# Patient Record
Sex: FEMALE | Race: WHITE | Employment: OTHER | RURAL
[De-identification: names, ages, dates, MRNs, and addresses within clinical notes are randomized per-mention and may not be internally consistent; named-entity substitution may affect disease eponyms.]

---

## 2017-06-30 ENCOUNTER — HOSPITAL ENCOUNTER (OUTPATIENT)
Dept: MRI IMAGING | Age: 57
Discharge: HOME | End: 2017-06-30
Attending: NURSE PRACTITIONER | Admitting: NURSE PRACTITIONER

## 2017-06-30 DIAGNOSIS — R06.02 SHORTNESS OF BREATH: ICD-10-CM

## 2017-06-30 DIAGNOSIS — R20.0 NUMBNESS AND TINGLING OF RIGHT FACE: ICD-10-CM

## 2017-06-30 DIAGNOSIS — R20.2 NUMBNESS AND TINGLING OF RIGHT FACE: ICD-10-CM

## 2017-10-23 ENCOUNTER — OFFICE VISIT (OUTPATIENT)
Dept: SURGERY | Age: 57
End: 2017-10-23
Payer: MEDICAID

## 2017-10-23 ENCOUNTER — HOSPITAL ENCOUNTER (OUTPATIENT)
Dept: OTHER | Age: 57
Discharge: HOME | End: 2017-10-23
Attending: SURGERY | Admitting: SURGERY

## 2017-10-23 ENCOUNTER — SURG/PROC ORDERS (OUTPATIENT)
Dept: SURGERY | Age: 57
End: 2017-10-23

## 2017-10-23 VITALS
TEMPERATURE: 97.8 F | SYSTOLIC BLOOD PRESSURE: 124 MMHG | BODY MASS INDEX: 37.79 KG/M2 | RESPIRATION RATE: 16 BRPM | DIASTOLIC BLOOD PRESSURE: 83 MMHG | HEIGHT: 65 IN | WEIGHT: 226.8 LBS | HEART RATE: 60 BPM

## 2017-10-23 DIAGNOSIS — Z12.11 COLON CANCER SCREENING: Primary | ICD-10-CM

## 2017-10-23 PROCEDURE — 99244 OFF/OP CNSLTJ NEW/EST MOD 40: CPT | Performed by: SURGERY

## 2017-10-23 RX ORDER — SIMVASTATIN 20 MG
20 TABLET ORAL NIGHTLY
COMMUNITY

## 2017-10-23 RX ORDER — POTASSIUM CHLORIDE 20 MEQ/1
20 TABLET, EXTENDED RELEASE ORAL DAILY
COMMUNITY

## 2017-10-23 RX ORDER — ALBUTEROL SULFATE 90 UG/1
2 AEROSOL, METERED RESPIRATORY (INHALATION) EVERY 6 HOURS PRN
COMMUNITY

## 2017-10-23 RX ORDER — CARVEDILOL 6.25 MG/1
6.25 TABLET ORAL 2 TIMES DAILY WITH MEALS
COMMUNITY

## 2017-10-23 RX ORDER — SODIUM CHLORIDE 0.9 % (FLUSH) 0.9 %
10 SYRINGE (ML) INJECTION PRN
Status: CANCELLED | OUTPATIENT
Start: 2017-10-23

## 2017-10-23 RX ORDER — CLOPIDOGREL BISULFATE 75 MG/1
75 TABLET ORAL DAILY
COMMUNITY

## 2017-10-23 RX ORDER — CHLORTHALIDONE 25 MG/1
25 TABLET ORAL DAILY
COMMUNITY

## 2017-10-23 RX ORDER — IBUPROFEN 800 MG/1
TABLET ORAL 3 TIMES DAILY
COMMUNITY
Start: 2017-10-17 | End: 2018-04-30

## 2017-10-23 RX ORDER — NITROGLYCERIN 0.4 MG/1
0.4 TABLET SUBLINGUAL EVERY 5 MIN PRN
COMMUNITY

## 2017-10-23 RX ORDER — SODIUM CHLORIDE 0.9 % (FLUSH) 0.9 %
10 SYRINGE (ML) INJECTION EVERY 12 HOURS SCHEDULED
Status: CANCELLED | OUTPATIENT
Start: 2017-10-23

## 2017-10-23 RX ORDER — AMOXICILLIN AND CLAVULANATE POTASSIUM 875; 125 MG/1; MG/1
TABLET, FILM COATED ORAL 2 TIMES DAILY
COMMUNITY
Start: 2017-10-17 | End: 2018-04-30

## 2017-10-23 RX ORDER — SODIUM CHLORIDE, SODIUM LACTATE, POTASSIUM CHLORIDE, CALCIUM CHLORIDE 600; 310; 30; 20 MG/100ML; MG/100ML; MG/100ML; MG/100ML
INJECTION, SOLUTION INTRAVENOUS CONTINUOUS
Status: CANCELLED | OUTPATIENT
Start: 2017-10-23

## 2017-10-23 RX ORDER — AMLODIPINE BESYLATE 5 MG/1
5 TABLET ORAL DAILY
COMMUNITY

## 2017-10-23 RX ORDER — LOSARTAN POTASSIUM 100 MG/1
100 TABLET ORAL DAILY
COMMUNITY

## 2017-10-23 ASSESSMENT — ENCOUNTER SYMPTOMS
EYES NEGATIVE: 1
GASTROINTESTINAL NEGATIVE: 1
RESPIRATORY NEGATIVE: 1

## 2017-10-23 NOTE — LETTER
[address] [facility]  Santhosh MARTINS [facility] ZACARIAS SANTOS A Division of [address] [facility]  Phone:  [phone]  Fax:  [phone]  Colonoscopy Procedure Preparation Instructions    Name: Jey Nolasco  YQSMH'N Date: 10/23/2017   : 1960  Age: 56 y.o. Sex: female   MRN: [id number]  Guarantor Acct: [de-identified]   Clinic: [facility]  Physician: Dr. Vinh Conley    Procedure Date: _____________     Prep Date: _____________  In order for the physician to perform a colonoscopy, a bowel cleanout must be performed at home prior to the procedure, which allows them to see the walls of the colon. Items to Purchase:  ? MiraLAX (Polyethylene Glycol)  You will need the 238g bottle, which can be purchased over-the-counter or with a prescription. ? Dulcolax  You will need 4 tablets, which can be purchased over-the-counter or with a prescription  ? Fluids (64 oz.)  You will need to purchase this to mix with the MiraLAX. Gatorade is recommended, because it helps replace the electrolytes lost during the bowel prep. One Day Prior to Procedure:  Beginning with breakfast and ending at midnight, you must commit to a liquid diet the day before your procedure. Items to Avoid:  ? Liquids that are RED or PURPLE in color. Examples include cranberry juice, grape juice, cherry drinks, and cherry or grape jello  ? Dairy Products. Examples include milk, cheese, ice cream, yogurt  ? Juices with a PULP. Examples include Orange and Grapefruit juice  Items You May Have:   ? Soups:  Clear broth or bouillon  ? Sport Drinks:  Gatorade, Powerade, Propel  ? Juices: White cranberry, white grape, apple, limeade, and strained lemonade  ? Other Beverages:  tea, coffee, Mehul-Aid, soft drinks, and water  ? Desserts:  water ices, [place] ices, popcicles, and jello  Start The Prep:  2:30 p.m. The evening before the procedure take two Dulcolax tablets with clear liquid.
________________________________  Relationship      ________________________________  Witness

## 2017-10-23 NOTE — PROGRESS NOTES
Consult Note    Patient:   Eduardo Vasquez   YOB: 1960   Facility:   [place] [address]   Referring/PCP: Young Ness  Date:     10/23/2017  Consultant:   Bhumi Livingston MD  Reason for consult: colon screening    Subjective: This 56 y.o. female was seen  for colon screening. Patient has No prior colonoscopy. Information was obtained from interview of  the patient, examination of the patient, and review of records. I did  update the past medical, surgical, social and / or family history. Patient has had no symptoms. Weight up 40 pounds in the last year. Medications:     Prior to Admission medications    Medication Sig Start Date End Date Taking? Authorizing Provider   aspirin 81 MG tablet Take 81 mg by mouth daily   Yes Historical Provider, MD   albuterol sulfate  (90 Base) MCG/ACT inhaler Inhale 2 puffs into the lungs every 6 hours as needed for Wheezing   Yes Historical Provider, MD   chlorthalidone (HYGROTON) 25 MG tablet Take 25 mg by mouth daily   Yes Historical Provider, MD   carvedilol (COREG) 6.25 MG tablet Take 6.25 mg by mouth 2 times daily (with meals)   Yes Historical Provider, MD   losartan (COZAAR) 100 MG tablet Take 100 mg by mouth daily   Yes Historical Provider, MD   nitroGLYCERIN (NITROSTAT) 0.4 MG SL tablet Place 0.4 mg under the tongue every 5 minutes as needed for Chest pain up to max of 3 total doses. If no relief after 1 dose, call 911.    Yes Historical Provider, MD   amLODIPine (NORVASC) 5 MG tablet Take 5 mg by mouth daily   Yes Historical Provider, MD   clopidogrel (PLAVIX) 75 MG tablet Take 75 mg by mouth daily   Yes Historical Provider, MD   potassium chloride (KLOR-CON M) 20 MEQ extended release tablet Take 20 mEq by mouth daily   Yes Historical Provider, MD   simvastatin (ZOCOR) 20 MG tablet Take 20 mg by mouth nightly   Yes Historical Provider, MD   sertraline (ZOLOFT) 50 MG tablet Take 50 mg by mouth daily   Yes Historical Provider, MD   ibuprofen (ADVIL;MOTRIN) 800 MG tablet 3 times daily 10/17/17  Yes Historical Provider, MD   amoxicillin-clavulanate (AUGMENTIN) 875-125 MG per tablet 2 times daily 10/17/17  Yes Historical Provider, MD        Allergies: No Known Allergies      History:     Past Medical History:   Diagnosis Date    CAD (coronary artery disease)     COPD (chronic obstructive pulmonary disease) ([address]     Hypertension      Past Surgical History:   Procedure Laterality Date    APPENDECTOMY      ARM SURGERY Left 1976    reconstruction due to  a fall    CARPAL TUNNEL RELEASE Right 1986    CORONARY ANGIOPLASTY WITH STENT PLACEMENT  2013    x 4 stents    TUBAL LIGATION  1992       Social:   Social History   Substance Use Topics    Smoking status: Current Every Day Smoker     Packs/day: 0.50     Years: 43.00     Types: Cigarettes    Smokeless tobacco: Never Used    Alcohol use Yes      Comment: social     Family:   Family History   Problem Relation Age of Onset    Diabetes Mother     Cancer Father     Diabetes Father        Review of Systems:   Review of Systems   Constitutional: Negative. HENT: Negative. Eyes: Negative. Respiratory: Negative. Cardiovascular: Negative. Gastrointestinal: Negative. Genitourinary: Negative. Musculoskeletal: Negative. Skin: Negative. Neurological: Negative. Endo/Heme/Allergies: Negative. Psychiatric/Behavioral: Negative. Objective:   Vital Signs:  /83 (Site: Left Arm, Position: Sitting)   Pulse 60   Temp 97.8 °F (36.6 °C)   Resp 16   Ht 5' 5\" (1.651 m)   Wt 226 lb 12.8 oz (102.9 kg)   LMP  (LMP Unknown) Comment: menopause  BMI 37.74 kg/m²    Vitals:    10/23/17 1151   BP: 124/83   Pulse: 60   Resp: 16   Temp: 97.8 °F (36.6 °C)       Physical Exam:   Physical Exam   Constitutional: She is oriented to person, place, and time. She appears well-developed and well-nourished. HENT:   Head: Normocephalic and atraumatic. Nose: Nose normal.   Mouth/Throat: Oropharynx is clear and moist. No oropharyngeal exudate. Eyes: Conjunctivae are normal. Pupils are equal, round, and reactive to light. No scleral icterus. Neck: Normal range of motion. Neck supple. No JVD present. No tracheal deviation present. No thyromegaly present. Cardiovascular: Normal rate, regular rhythm and normal heart sounds. Exam reveals no gallop and no friction rub. No murmur heard. Pulmonary/Chest: Effort normal and breath sounds normal. She has no wheezes. She has no rales. Abdominal: Soft. Bowel sounds are normal. She exhibits no distension and no mass. There is no tenderness. There is no rebound and no guarding. Musculoskeletal: Normal range of motion. She exhibits no edema or tenderness. Lymphadenopathy:     She has no cervical adenopathy. Neurological: She is alert and oriented to person, place, and time. She has normal reflexes. She exhibits normal muscle tone. Coordination normal.   Skin: Skin is warm and dry. Psychiatric: She has a normal mood and affect. Her behavior is normal.   Nursing note and vitals reviewed. Assessment: For elective colon screening      Plan:   1. Patient has reviewed colonoscopy pamphlet and we have discussed the possible risks and complications, including the remote chance of perforation, and the patient appears to understand and gives informed consent  2. We are scheduling a colonoscopy at [facility] on 10/27/17. 3. The patient will take the outpatient Miralax prep. Copy of this consult has been faxed to WILLEM Andrea; thank you for the opportunity to participate in this patient's care.     Electronically signed by Loki Georges MD on 10/23/2017 at 12:23 PM

## 2017-10-23 NOTE — PROGRESS NOTES
Patient is here today for colon screening. States no prior colonoscopy. Denies having any symptoms with colon.

## 2017-10-24 ENCOUNTER — HOSPITAL ENCOUNTER (OUTPATIENT)
Dept: GENERAL RADIOLOGY | Age: 57
Discharge: HOME | End: 2017-10-24
Attending: NURSE PRACTITIONER | Admitting: NURSE PRACTITIONER

## 2017-10-24 DIAGNOSIS — R06.02 SHORTNESS OF BREATH: ICD-10-CM

## 2017-10-24 DIAGNOSIS — Z12.39 BREAST CANCER SCREENING: ICD-10-CM

## 2017-10-24 DIAGNOSIS — Z72.0 TOBACCO USE: ICD-10-CM

## 2018-04-19 ENCOUNTER — HOSPITAL ENCOUNTER (OUTPATIENT)
Dept: RESPIRATORY THERAPY | Age: 58
Discharge: HOME | End: 2018-04-19

## 2018-04-19 DIAGNOSIS — R06.02 SHORTNESS OF BREATH: ICD-10-CM

## 2018-10-16 ENCOUNTER — HOSPITAL ENCOUNTER (OUTPATIENT)
Dept: GENERAL RADIOLOGY | Facility: HOSPITAL | Age: 58
Discharge: HOME | End: 2018-10-16
Admitting: PODIATRIST

## 2018-10-16 ENCOUNTER — APPOINTMENT (OUTPATIENT)
Dept: PREADMISSION TESTING | Facility: HOSPITAL | Age: 58
End: 2018-10-16

## 2018-10-16 ENCOUNTER — PREP FOR SURGERY (OUTPATIENT)
Dept: OTHER | Facility: HOSPITAL | Age: 58
End: 2018-10-16

## 2018-10-16 ENCOUNTER — OFFICE VISIT (OUTPATIENT)
Dept: ORTHOPEDIC SURGERY | Facility: CLINIC | Age: 58
End: 2018-10-16

## 2018-10-16 VITALS — HEIGHT: 66 IN | BODY MASS INDEX: 36.16 KG/M2 | WEIGHT: 225 LBS | RESPIRATION RATE: 18 BRPM

## 2018-10-16 VITALS — HEIGHT: 66 IN | BODY MASS INDEX: 36.16 KG/M2 | WEIGHT: 225 LBS

## 2018-10-16 DIAGNOSIS — S82.841A CLOSED BIMALLEOLAR FRACTURE OF RIGHT ANKLE, INITIAL ENCOUNTER: Primary | ICD-10-CM

## 2018-10-16 DIAGNOSIS — S82.891A CLOSED FRACTURE OF RIGHT ANKLE, INITIAL ENCOUNTER: ICD-10-CM

## 2018-10-16 DIAGNOSIS — S82.891A CLOSED FRACTURE OF RIGHT ANKLE, INITIAL ENCOUNTER: Primary | ICD-10-CM

## 2018-10-16 LAB
ALBUMIN SERPL-MCNC: 4.5 G/DL (ref 3.5–5)
ALBUMIN/GLOB SERPL: 1.4 G/DL (ref 1–2)
ALP SERPL-CCNC: 46 U/L (ref 38–126)
ALT SERPL W P-5'-P-CCNC: 35 U/L (ref 13–69)
ANION GAP SERPL CALCULATED.3IONS-SCNC: 10.7 MMOL/L (ref 10–20)
AST SERPL-CCNC: 33 U/L (ref 15–46)
BASOPHILS # BLD AUTO: 0.04 10*3/MM3 (ref 0–0.2)
BASOPHILS NFR BLD AUTO: 0.4 % (ref 0–2.5)
BILIRUB SERPL-MCNC: 0.6 MG/DL (ref 0.2–1.3)
BUN BLD-MCNC: 17 MG/DL (ref 7–20)
BUN/CREAT SERPL: 17 (ref 7.1–23.5)
CALCIUM SPEC-SCNC: 9.9 MG/DL (ref 8.4–10.2)
CHLORIDE SERPL-SCNC: 101 MMOL/L (ref 98–107)
CO2 SERPL-SCNC: 31 MMOL/L (ref 26–30)
CREAT BLD-MCNC: 1 MG/DL (ref 0.6–1.3)
DEPRECATED RDW RBC AUTO: 45.7 FL (ref 37–54)
EOSINOPHIL # BLD AUTO: 0.35 10*3/MM3 (ref 0–0.7)
EOSINOPHIL NFR BLD AUTO: 3.2 % (ref 0–7)
ERYTHROCYTE [DISTWIDTH] IN BLOOD BY AUTOMATED COUNT: 13.1 % (ref 11.5–14.5)
GFR SERPL CREATININE-BSD FRML MDRD: 57 ML/MIN/1.73
GLOBULIN UR ELPH-MCNC: 3.3 GM/DL
GLUCOSE BLD-MCNC: 91 MG/DL (ref 74–98)
HCT VFR BLD AUTO: 45.6 % (ref 37–47)
HGB BLD-MCNC: 15.3 G/DL (ref 12–16)
IMM GRANULOCYTES # BLD: 0.03 10*3/MM3 (ref 0–0.06)
IMM GRANULOCYTES NFR BLD: 0.3 % (ref 0–0.6)
LYMPHOCYTES # BLD AUTO: 2.35 10*3/MM3 (ref 0.6–3.4)
LYMPHOCYTES NFR BLD AUTO: 21.6 % (ref 10–50)
MCH RBC QN AUTO: 31.8 PG (ref 27–31)
MCHC RBC AUTO-ENTMCNC: 33.6 G/DL (ref 30–37)
MCV RBC AUTO: 94.8 FL (ref 81–99)
MONOCYTES # BLD AUTO: 0.75 10*3/MM3 (ref 0–0.9)
MONOCYTES NFR BLD AUTO: 6.9 % (ref 0–12)
NEUTROPHILS # BLD AUTO: 7.38 10*3/MM3 (ref 2–6.9)
NEUTROPHILS NFR BLD AUTO: 67.6 % (ref 37–80)
NRBC BLD MANUAL-RTO: 0 /100 WBC (ref 0–0)
PLATELET # BLD AUTO: 208 10*3/MM3 (ref 130–400)
PMV BLD AUTO: 10.7 FL (ref 6–12)
POTASSIUM BLD-SCNC: 2.7 MMOL/L (ref 3.5–5.1)
PROT SERPL-MCNC: 7.8 G/DL (ref 6.3–8.2)
RBC # BLD AUTO: 4.81 10*6/MM3 (ref 4.2–5.4)
SODIUM BLD-SCNC: 140 MMOL/L (ref 137–145)
WBC NRBC COR # BLD: 10.9 10*3/MM3 (ref 4.8–10.8)

## 2018-10-16 PROCEDURE — 85025 COMPLETE CBC W/AUTO DIFF WBC: CPT | Performed by: PODIATRIST

## 2018-10-16 PROCEDURE — 71046 X-RAY EXAM CHEST 2 VIEWS: CPT

## 2018-10-16 PROCEDURE — 36415 COLL VENOUS BLD VENIPUNCTURE: CPT

## 2018-10-16 PROCEDURE — 93005 ELECTROCARDIOGRAM TRACING: CPT | Performed by: PODIATRIST

## 2018-10-16 PROCEDURE — 80053 COMPREHEN METABOLIC PANEL: CPT | Performed by: PODIATRIST

## 2018-10-16 PROCEDURE — 99204 OFFICE O/P NEW MOD 45 MIN: CPT | Performed by: PODIATRIST

## 2018-10-16 RX ORDER — CHLORTHALIDONE 25 MG/1
25 TABLET ORAL DAILY
COMMUNITY
Start: 2018-10-12

## 2018-10-16 RX ORDER — HYDROCODONE BITARTRATE AND ACETAMINOPHEN 5; 325 MG/1; MG/1
1 TABLET ORAL EVERY 6 HOURS PRN
COMMUNITY
Start: 2018-10-15 | End: 2020-12-21

## 2018-10-16 RX ORDER — BUSPIRONE HYDROCHLORIDE 5 MG/1
5 TABLET ORAL 3 TIMES DAILY
COMMUNITY
Start: 2018-10-12

## 2018-10-16 RX ORDER — LOSARTAN POTASSIUM 100 MG/1
TABLET ORAL
COMMUNITY
Start: 2018-10-12

## 2018-10-16 RX ORDER — ASPIRIN 81 MG/1
81 TABLET, DELAYED RELEASE ORAL DAILY
COMMUNITY
Start: 2018-10-12

## 2018-10-16 RX ORDER — LORATADINE 10 MG/1
10 TABLET ORAL DAILY
COMMUNITY
Start: 2018-10-12

## 2018-10-16 RX ORDER — POTASSIUM CHLORIDE 20 MEQ/1
20 TABLET, EXTENDED RELEASE ORAL DAILY
COMMUNITY
Start: 2018-10-12

## 2018-10-16 RX ORDER — CLOPIDOGREL BISULFATE 75 MG/1
75 TABLET ORAL
COMMUNITY
Start: 2018-10-12

## 2018-10-16 RX ORDER — AMLODIPINE BESYLATE 5 MG/1
5 TABLET ORAL
COMMUNITY
Start: 2018-10-12

## 2018-10-16 RX ORDER — SIMVASTATIN 20 MG
20 TABLET ORAL NIGHTLY
COMMUNITY
Start: 2018-10-12

## 2018-10-16 RX ORDER — AMITRIPTYLINE HYDROCHLORIDE 25 MG/1
10 TABLET, FILM COATED ORAL NIGHTLY
COMMUNITY
Start: 2018-10-12

## 2018-10-16 RX ORDER — CEFAZOLIN SODIUM 2 G/50ML
2 SOLUTION INTRAVENOUS ONCE
Status: CANCELLED | OUTPATIENT
Start: 2018-10-16 | End: 2018-10-16

## 2018-10-16 RX ORDER — CARVEDILOL 6.25 MG/1
6.25 TABLET ORAL 2 TIMES DAILY WITH MEALS
COMMUNITY
Start: 2018-10-12

## 2018-10-16 NOTE — NURSING NOTE
1535 CALLED ABNORMAL PRELIM EKG TO MICKEY WILLARD CRNA, WITH PT'S HX OF STENT PLACEMENT AND SEEING DR AGOSTO IN THE PAST. WILL HAVE EKG READ AND CALL BACK IF STILL ABNORMAL. PT IS TO SEE DR AGOSTO THIS Friday WILL SEND A READ COPY TO HIS OFFICE PRIOR TO THIS VISIT IF AVAILABLE IF NOT AVAILABLE WILL SEND PRELIM REPORT.

## 2018-10-16 NOTE — PROGRESS NOTES
Subjective   Patient ID: Valentina Theodore is a 58 y.o. female   Pain and Fracture of the Right Ankle   comes in the office today status post a fall that happened on Saturday.  She states she fell at home in the mud and heard 2 pops.  She was seen at [facility] and comes in today with a posterior splint and is sitting on a walker.  States she's fairly healthy aside from her blood pressure but she has had stents placed in her heart and takes Plavix.  She states Dr. Painter is her cardiologist but she has not seen him in quite some time.    History of Present Illness    Pain Score: 9  Pain Location: Ankle  Pain Orientation: Right     Pain Descriptors: Stabbing, Sharp, Throbbing     Pain Onset: Sudden     Clinical Progression: Gradually worsening              Result of Injury: Yes ( reports she slipped in the mud at home on 10/13/18 and fell on the knee and heard a loud pop in the right ankle )  Work-Related Injury: No    Review of Systems   Constitutional: Negative for diaphoresis, fever and unexpected weight change.   HENT: Negative for dental problem and sore throat.    Eyes: Negative for visual disturbance.   Respiratory: Negative for shortness of breath.    Cardiovascular: Negative for chest pain.   Gastrointestinal: Negative for abdominal pain, constipation, diarrhea, nausea and vomiting.   Genitourinary: Negative for difficulty urinating and frequency.   Musculoskeletal: Positive for arthralgias (Left ankle).   Neurological: Negative for headaches.   Hematological: Does not bruise/bleed easily.   All other systems reviewed and are negative.      Past Medical History:   Diagnosis Date   • Hypertension         Past Surgical History:   Procedure Laterality Date   • APPENDECTOMY     • ARM TENDON REPAIR Left 1976   • TUBAL ABDOMINAL LIGATION         No Known Allergies      Current Outpatient Prescriptions:   •  amitriptyline (ELAVIL) 25 MG tablet, , Disp: , Rfl:   •  amLODIPine (NORVASC) 5 MG  tablet, , Disp: , Rfl:   •  busPIRone (BUSPAR) 5 MG tablet, , Disp: , Rfl:   •  carvedilol (COREG) 6.25 MG tablet, , Disp: , Rfl:   •  chlorthalidone (HYGROTON) 25 MG tablet, , Disp: , Rfl:   •  clopidogrel (PLAVIX) 75 MG tablet, , Disp: , Rfl:   •  HYDROcodone-acetaminophen (NORCO) 5-325 MG per tablet, , Disp: , Rfl:   •  loratadine (CLARITIN) 10 MG tablet, , Disp: , Rfl:   •  losartan (COZAAR) 100 MG tablet, , Disp: , Rfl:   •  potassium chloride (K-DUR,KLOR-CON) 20 MEQ CR tablet, , Disp: , Rfl:   •  sertraline (ZOLOFT) 50 MG tablet, , Disp: , Rfl:   •  simvastatin (ZOCOR) 20 MG tablet, , Disp: , Rfl:   •  SM ASPIRIN ADULT LOW STRENGTH 81 MG EC tablet, , Disp: , Rfl:     Family History   Problem Relation Age of Onset   • Diabetes Mother        Social History     Social History   • Marital status:      Spouse name: N/A   • Number of children: N/A   • Years of education: N/A     Occupational History   • Not on file.     Social History Main Topics   • Smoking status: Current Every Day Smoker     Packs/day: 2.00     Start date: 1976   • Smokeless tobacco: Never Used   • Alcohol use No   • Drug use: No   • Sexual activity: Defer     Other Topics Concern   • Not on file     Social History Narrative   • No narrative on file       Ready to quit: No  Counseling given: Yes       I have reviewed all of the above social hx, family hx, surgical hx, medications, allergies & ROS and confirm that it is accurate.  Objective   Physical Exam   Constitutional: She is oriented to person, place, and time. She appears well-developed and well-nourished.   HENT:   Head: Normocephalic and atraumatic.   Eyes: Pupils are equal, round, and reactive to light. EOM are normal.   Neck: Normal range of motion.   Cardiovascular: Normal rate.    Pulmonary/Chest: Effort normal.   Abdominal: Soft. Bowel sounds are normal.   Musculoskeletal: Normal range of motion.   Neurological: She is alert and oriented to person, place, and time. She has  normal reflexes.   Skin: Skin is warm.   Psychiatric: She has a normal mood and affect. Her behavior is normal. Judgment and thought content normal.   Nursing note and vitals reviewed.    Ortho Exam  Ortho Exam right Lower extremity exam:  Vascular: Pulses palpable, pedal hair noted, 2+ edema noted to the right lower extremity, CFT brisk  Neuro: Sensation intact.  No sign of compartment syndrome.  Derm: She shows minimal bruising and ecchymosis to the right ankle and foot  MSK: She is able to move her digits.  Ankle range of motion is limited due to pain and guarding.  She is tender globally about the ankle.    Assessment/Plan right bimalleolar ankle fracture  Independent Review of Radiographic Studies:    Three-view x-rays brought with her from an outside facility.  No comparison films available.  There is a obliquely oriented Jiang B type fracture of the right distal fibula that starts at the joint.  There is slight shortening and lateral displacement with some gapping between the 2 fragments.  There is also a very irregular appearing what seems to be transversely oriented medial malleolus fracture that may just involve the anterior colliculus but it's difficult to tell based off the films.  Laboratory and Other Studies:     Medical Decision Making:        Procedures  [] No procedures were performed in office today.    There are no diagnoses linked to this encounter.      Recommendations/Plan:  I discussed with her that based off radiographic findings and this being a bimalleolar ankle fracture I would recommend surgical treatment.  I explained that while the fractures are not significantly displaced fractures of both sides of the ankle create a significant instability which can be extremely problematic in the long-term.  She expresses understanding.  She will have to be sent for cardiac clearance first and we will attempt to contact Dr. Painter for that.  If she is cleared for surgery we will look to schedule at  the following week.  This could be done under straight regional anesthesia if need be.  I would also like to hold her Plavix if at all possible for a few days preoperatively.  I will coordinate Berenice indicate this with Dr. Painter.  We will go ahead and send her for preoperative testing.  She is to remain strictly nonweightbearing to the right lower extremity.  Her previously placed splint and stirrup were placed.  She is to continue with rest, ice, compression, elevation.  I recommended Tylenol.  I offered pain medication but she states that she doesn't like to wait makes her feel.  Anti-inflammatories are contraindicated due to the Plavix.  I reviewed the patient's x-rays with them and discussed the injury and the nature of the fracture.  We discussed conservative versus surgical treatment options.  I discussed with them that due to the nature of the fracture I recommend surgical intervention including open reduction internal fixation.  I discussed all the risks which benefits and complications of procedure.  This includes bleeding, infection, nerve damage, wound complications, need for further surgery, delayed union, nonunion, potential hardware removal, blood clot and DVT as well as death.  They expressed understanding of this agreed to proceed with surgery consent was obtained.  We will schedule them for ORIF      No Follow-up on file.  Patient agreeable to call or return sooner for any concerns.

## 2018-10-18 NOTE — TELEPHONE ENCOUNTER
Patient called for pain medication spoke with Dr. Diaz please call in tramadol.  Tramadol called in and patient informed

## 2018-10-23 RX ORDER — TRAMADOL HYDROCHLORIDE 50 MG/1
50 TABLET ORAL EVERY 6 HOURS PRN
Qty: 30 TABLET | Refills: 0
Start: 2018-10-23 | End: 2020-12-21

## 2018-10-25 ENCOUNTER — HOSPITAL ENCOUNTER (OUTPATIENT)
Facility: HOSPITAL | Age: 58
Discharge: HOME | End: 2018-10-25
Payer: MEDICAID

## 2018-10-25 DIAGNOSIS — Z86.39 HISTORY OF LOW POTASSIUM: Primary | ICD-10-CM

## 2018-10-25 LAB — POTASSIUM SERPL-SCNC: 3.7 MMOL/L (ref 3.4–5.1)

## 2018-10-25 PROCEDURE — 84132 ASSAY OF SERUM POTASSIUM: CPT

## 2018-10-25 PROCEDURE — 36415 COLL VENOUS BLD VENIPUNCTURE: CPT

## 2018-10-26 ENCOUNTER — APPOINTMENT (OUTPATIENT)
Dept: GENERAL RADIOLOGY | Facility: HOSPITAL | Age: 58
End: 2018-10-26
Attending: PODIATRIST

## 2018-10-26 ENCOUNTER — ANESTHESIA (OUTPATIENT)
Dept: PERIOP | Facility: HOSPITAL | Age: 58
End: 2018-10-26

## 2018-10-26 ENCOUNTER — ANESTHESIA EVENT (OUTPATIENT)
Dept: PERIOP | Facility: HOSPITAL | Age: 58
End: 2018-10-26

## 2018-10-26 ENCOUNTER — HOSPITAL ENCOUNTER (OUTPATIENT)
Facility: HOSPITAL | Age: 58
Setting detail: HOSPITAL OUTPATIENT SURGERY
Discharge: HOME | End: 2018-10-26
Attending: PODIATRIST | Admitting: PODIATRIST

## 2018-10-26 VITALS
RESPIRATION RATE: 18 BRPM | DIASTOLIC BLOOD PRESSURE: 64 MMHG | HEART RATE: 70 BPM | OXYGEN SATURATION: 93 % | SYSTOLIC BLOOD PRESSURE: 108 MMHG | TEMPERATURE: 97.9 F

## 2018-10-26 DIAGNOSIS — S82.891A CLOSED FRACTURE OF RIGHT ANKLE, INITIAL ENCOUNTER: ICD-10-CM

## 2018-10-26 LAB — POTASSIUM BLD-SCNC: 3.5 MMOL/L (ref 3.5–5.1)

## 2018-10-26 PROCEDURE — C1713 ANCHOR/SCREW BN/BN,TIS/BN: HCPCS | Performed by: PODIATRIST

## 2018-10-26 PROCEDURE — 15271 SKIN SUB GRAFT TRNK/ARM/LEG: CPT | Performed by: PODIATRIST

## 2018-10-26 PROCEDURE — 25010000002 MIDAZOLAM PER 1 MG: Performed by: NURSE ANESTHETIST, CERTIFIED REGISTERED

## 2018-10-26 PROCEDURE — 94799 UNLISTED PULMONARY SVC/PX: CPT

## 2018-10-26 PROCEDURE — 25010000002 ONDANSETRON PER 1 MG: Performed by: NURSE ANESTHETIST, CERTIFIED REGISTERED

## 2018-10-26 PROCEDURE — 94640 AIRWAY INHALATION TREATMENT: CPT

## 2018-10-26 PROCEDURE — 25010000003 CEFAZOLIN SODIUM-DEXTROSE 2-3 GM-%(50ML) RECONSTITUTED SOLUTION: Performed by: PODIATRIST

## 2018-10-26 PROCEDURE — 76000 FLUOROSCOPY <1 HR PHYS/QHP: CPT

## 2018-10-26 PROCEDURE — 25010000002 PROPOFOL 200 MG/20ML EMULSION: Performed by: NURSE ANESTHETIST, CERTIFIED REGISTERED

## 2018-10-26 PROCEDURE — 27814 TREATMENT OF ANKLE FRACTURE: CPT | Performed by: PODIATRIST

## 2018-10-26 PROCEDURE — 84132 ASSAY OF SERUM POTASSIUM: CPT | Performed by: PODIATRIST

## 2018-10-26 PROCEDURE — 25010000002 DEXAMETHASONE PER 1 MG: Performed by: NURSE ANESTHETIST, CERTIFIED REGISTERED

## 2018-10-26 DEVICE — MINI MONSTER 4.0 X 40MM HEADED SCREW, LONG THREAD
Type: IMPLANTABLE DEVICE | Site: ANKLE | Status: FUNCTIONAL
Brand: MONSTER SCREW SYSTEM

## 2018-10-26 DEVICE — MINI MONSTER 4.0 X 40MM HEADED SCREW, SHORT THREAD
Type: IMPLANTABLE DEVICE | Site: ANKLE | Status: FUNCTIONAL
Brand: MONSTER SCREW SYSTEM

## 2018-10-26 DEVICE — GORILLA, ANKLE FX, ANATOMICAL FIBULAR PLATE R, 09-HOLE
Type: IMPLANTABLE DEVICE | Site: ANKLE | Status: FUNCTIONAL
Brand: GORILLA PLATING SYSTEM

## 2018-10-26 DEVICE — 3.5 X 10 MM R3CON LOCKING PLATE SCREW
Type: IMPLANTABLE DEVICE | Site: ANKLE | Status: FUNCTIONAL
Brand: GORILLA PLATING SYSTEM

## 2018-10-26 DEVICE — 3.5 X 12 MM R3CON LOCKING PLATE SCREW
Type: IMPLANTABLE DEVICE | Site: ANKLE | Status: FUNCTIONAL
Brand: GORILLA PLATING SYSTEM

## 2018-10-26 DEVICE — 3.5 X 20 MM R3CON NON-LOCKING PLATE SCREW
Type: IMPLANTABLE DEVICE | Site: ANKLE | Status: FUNCTIONAL
Brand: GORILLA PLATING SYSTEM

## 2018-10-26 DEVICE — 3.5 X 12 MM R3CON NON-LOCKING PLATE SCREW
Type: IMPLANTABLE DEVICE | Site: ANKLE | Status: FUNCTIONAL
Brand: GORILLA PLATING SYSTEM

## 2018-10-26 DEVICE — ALLOGRFT AMNIO AMNIOFIX 2X12CM: Type: IMPLANTABLE DEVICE | Site: ANKLE | Status: FUNCTIONAL

## 2018-10-26 RX ORDER — MIDAZOLAM HYDROCHLORIDE 1 MG/ML
INJECTION INTRAMUSCULAR; INTRAVENOUS AS NEEDED
Status: DISCONTINUED | OUTPATIENT
Start: 2018-10-26 | End: 2018-10-26 | Stop reason: SURG

## 2018-10-26 RX ORDER — BUPIVACAINE HYDROCHLORIDE 2.5 MG/ML
INJECTION, SOLUTION EPIDURAL; INFILTRATION; INTRACAUDAL
Status: COMPLETED
Start: 2018-10-26 | End: 2018-10-26

## 2018-10-26 RX ORDER — OXYCODONE AND ACETAMINOPHEN 7.5; 325 MG/1; MG/1
1 TABLET ORAL EVERY 6 HOURS PRN
Qty: 30 TABLET | Refills: 0 | Status: SHIPPED | OUTPATIENT
Start: 2018-10-26 | End: 2020-12-21

## 2018-10-26 RX ORDER — ONDANSETRON 2 MG/ML
4 INJECTION INTRAMUSCULAR; INTRAVENOUS ONCE AS NEEDED
Status: DISCONTINUED | OUTPATIENT
Start: 2018-10-26 | End: 2018-10-26 | Stop reason: HOSPADM

## 2018-10-26 RX ORDER — MEPERIDINE HYDROCHLORIDE 50 MG/ML
50 INJECTION INTRAMUSCULAR; INTRAVENOUS; SUBCUTANEOUS ONCE AS NEEDED
Status: DISCONTINUED | OUTPATIENT
Start: 2018-10-26 | End: 2018-10-26 | Stop reason: HOSPADM

## 2018-10-26 RX ORDER — ONDANSETRON 2 MG/ML
INJECTION INTRAMUSCULAR; INTRAVENOUS AS NEEDED
Status: DISCONTINUED | OUTPATIENT
Start: 2018-10-26 | End: 2018-10-26 | Stop reason: SURG

## 2018-10-26 RX ORDER — BUPIVACAINE HYDROCHLORIDE 2.5 MG/ML
INJECTION, SOLUTION EPIDURAL; INFILTRATION; INTRACAUDAL AS NEEDED
Status: DISCONTINUED | OUTPATIENT
Start: 2018-10-26 | End: 2018-10-26 | Stop reason: SURG

## 2018-10-26 RX ORDER — SODIUM CHLORIDE, SODIUM LACTATE, POTASSIUM CHLORIDE, CALCIUM CHLORIDE 600; 310; 30; 20 MG/100ML; MG/100ML; MG/100ML; MG/100ML
1000 INJECTION, SOLUTION INTRAVENOUS CONTINUOUS
Status: DISCONTINUED | OUTPATIENT
Start: 2018-10-26 | End: 2018-10-26 | Stop reason: HOSPADM

## 2018-10-26 RX ORDER — BUPIVACAINE HCL/0.9 % NACL/PF 0.125 %
PLASTIC BAG, INJECTION (ML) EPIDURAL AS NEEDED
Status: DISCONTINUED | OUTPATIENT
Start: 2018-10-26 | End: 2018-10-26 | Stop reason: SURG

## 2018-10-26 RX ORDER — CEFAZOLIN SODIUM 2 G/50ML
2 SOLUTION INTRAVENOUS ONCE
Status: COMPLETED | OUTPATIENT
Start: 2018-10-26 | End: 2018-10-26

## 2018-10-26 RX ORDER — IPRATROPIUM BROMIDE AND ALBUTEROL SULFATE 2.5; .5 MG/3ML; MG/3ML
3 SOLUTION RESPIRATORY (INHALATION) ONCE AS NEEDED
Status: COMPLETED | OUTPATIENT
Start: 2018-10-26 | End: 2018-10-26

## 2018-10-26 RX ORDER — IPRATROPIUM BROMIDE AND ALBUTEROL SULFATE 2.5; .5 MG/3ML; MG/3ML
3 SOLUTION RESPIRATORY (INHALATION) ONCE AS NEEDED
Status: DISCONTINUED | OUTPATIENT
Start: 2018-10-26 | End: 2018-10-26 | Stop reason: HOSPADM

## 2018-10-26 RX ORDER — DEXAMETHASONE SODIUM PHOSPHATE 4 MG/ML
INJECTION, SOLUTION INTRA-ARTICULAR; INTRALESIONAL; INTRAMUSCULAR; INTRAVENOUS; SOFT TISSUE
Status: DISCONTINUED
Start: 2018-10-26 | End: 2018-10-26 | Stop reason: HOSPADM

## 2018-10-26 RX ORDER — BACITRACIN 50000 [IU]/1
INJECTION, POWDER, FOR SOLUTION INTRAMUSCULAR AS NEEDED
Status: DISCONTINUED | OUTPATIENT
Start: 2018-10-26 | End: 2018-10-26 | Stop reason: HOSPADM

## 2018-10-26 RX ORDER — KETAMINE HYDROCHLORIDE 50 MG/ML
INJECTION, SOLUTION, CONCENTRATE INTRAMUSCULAR; INTRAVENOUS AS NEEDED
Status: DISCONTINUED | OUTPATIENT
Start: 2018-10-26 | End: 2018-10-26 | Stop reason: SURG

## 2018-10-26 RX ORDER — SODIUM CHLORIDE 0.9 % (FLUSH) 0.9 %
3 SYRINGE (ML) INJECTION AS NEEDED
Status: DISCONTINUED | OUTPATIENT
Start: 2018-10-26 | End: 2018-10-26 | Stop reason: HOSPADM

## 2018-10-26 RX ORDER — BUPIVACAINE HYDROCHLORIDE 5 MG/ML
INJECTION, SOLUTION EPIDURAL; INTRACAUDAL
Status: COMPLETED
Start: 2018-10-26 | End: 2018-10-26

## 2018-10-26 RX ORDER — PROPOFOL 10 MG/ML
INJECTION, EMULSION INTRAVENOUS AS NEEDED
Status: DISCONTINUED | OUTPATIENT
Start: 2018-10-26 | End: 2018-10-26 | Stop reason: SURG

## 2018-10-26 RX ORDER — BUPIVACAINE HYDROCHLORIDE 5 MG/ML
INJECTION, SOLUTION EPIDURAL; INTRACAUDAL AS NEEDED
Status: DISCONTINUED | OUTPATIENT
Start: 2018-10-26 | End: 2018-10-26 | Stop reason: SURG

## 2018-10-26 RX ORDER — DEXAMETHASONE SODIUM PHOSPHATE 4 MG/ML
INJECTION, SOLUTION INTRA-ARTICULAR; INTRALESIONAL; INTRAMUSCULAR; INTRAVENOUS; SOFT TISSUE AS NEEDED
Status: DISCONTINUED | OUTPATIENT
Start: 2018-10-26 | End: 2018-10-26 | Stop reason: SURG

## 2018-10-26 RX ORDER — SULFAMETHOXAZOLE AND TRIMETHOPRIM 800; 160 MG/1; MG/1
1 TABLET ORAL 2 TIMES DAILY
Qty: 20 TABLET | Refills: 0 | Status: SHIPPED | OUTPATIENT
Start: 2018-10-26 | End: 2020-12-21

## 2018-10-26 RX ADMIN — KETAMINE HYDROCHLORIDE 25 MG: 50 INJECTION, SOLUTION INTRAMUSCULAR; INTRAVENOUS at 08:15

## 2018-10-26 RX ADMIN — Medication 100 MCG: at 08:31

## 2018-10-26 RX ADMIN — LIDOCAINE HYDROCHLORIDE 40 MG: 20 INJECTION, SOLUTION INTRAVENOUS at 08:02

## 2018-10-26 RX ADMIN — Medication 100 MCG: at 08:37

## 2018-10-26 RX ADMIN — DEXAMETHASONE SODIUM PHOSPHATE 8 MG: 4 INJECTION, SOLUTION INTRAMUSCULAR; INTRAVENOUS at 07:47

## 2018-10-26 RX ADMIN — PROPOFOL 150 MG: 10 INJECTION, EMULSION INTRAVENOUS at 08:03

## 2018-10-26 RX ADMIN — Medication 100 MCG: at 08:28

## 2018-10-26 RX ADMIN — CEFAZOLIN SODIUM 2 G: 2 SOLUTION INTRAVENOUS at 07:59

## 2018-10-26 RX ADMIN — SODIUM CHLORIDE, POTASSIUM CHLORIDE, SODIUM LACTATE AND CALCIUM CHLORIDE 1000 ML: 600; 310; 30; 20 INJECTION, SOLUTION INTRAVENOUS at 07:27

## 2018-10-26 RX ADMIN — Medication 100 MCG: at 08:34

## 2018-10-26 RX ADMIN — Medication 100 MCG: at 08:25

## 2018-10-26 RX ADMIN — DEXAMETHASONE SODIUM PHOSPHATE 8 MG: 4 INJECTION, SOLUTION INTRAMUSCULAR; INTRAVENOUS at 08:28

## 2018-10-26 RX ADMIN — BUPIVACAINE HYDROCHLORIDE 15 ML: 2.5 INJECTION, SOLUTION EPIDURAL; INFILTRATION; INTRACAUDAL; PERINEURAL at 07:52

## 2018-10-26 RX ADMIN — MIDAZOLAM HYDROCHLORIDE 2 MG: 1 INJECTION, SOLUTION INTRAMUSCULAR; INTRAVENOUS at 07:40

## 2018-10-26 RX ADMIN — Medication 100 MCG: at 08:17

## 2018-10-26 RX ADMIN — PROPOFOL 50 MG: 10 INJECTION, EMULSION INTRAVENOUS at 08:04

## 2018-10-26 RX ADMIN — Medication 100 MCG: at 08:21

## 2018-10-26 RX ADMIN — ONDANSETRON 4 MG: 2 INJECTION INTRAMUSCULAR; INTRAVENOUS at 08:28

## 2018-10-26 RX ADMIN — DEXAMETHASONE SODIUM PHOSPHATE 4 MG: 4 INJECTION, SOLUTION INTRAMUSCULAR; INTRAVENOUS at 07:52

## 2018-10-26 RX ADMIN — IPRATROPIUM BROMIDE AND ALBUTEROL SULFATE 3 ML: .5; 3 SOLUTION RESPIRATORY (INHALATION) at 07:45

## 2018-10-26 RX ADMIN — BUPIVACAINE HYDROCHLORIDE 25 ML: 5 INJECTION, SOLUTION EPIDURAL; INTRACAUDAL; PERINEURAL at 07:47

## 2018-10-26 NOTE — ANESTHESIA POSTPROCEDURE EVALUATION
Patient: Valentina Theodore    Procedure Summary     Date:  10/26/18 Room / Location:  [facility] OR 1 / [facility] OR    Anesthesia Start:  0759 Anesthesia Stop:      Procedure:  BIMALLEOLAR FRACTURE OPEN REDUCTION INTERNAL FIXATION (Right Ankle) Diagnosis:       Closed fracture of right ankle, initial encounter      (Closed fracture of right ankle, initial encounter [S82.894U])    Surgeon:  Genaro Diaz DPM Provider:  Guerrero Dickerson CRNA    Anesthesia Type:  general ASA Status:  3          Anesthesia Type: general  Last vitals  BP   110/78 (10/26/18 0749)   Temp   98 °F (36.7 °C) (10/26/18 0712)   Pulse   66 (10/26/18 0754)   Resp   22 (10/26/18 0747)     SpO2   100 % (10/26/18 0747)     Post Anesthesia Care and Evaluation    Patient location during evaluation: PACU  Patient participation: complete - patient participated  Level of consciousness: awake  Pain score: 3  Pain management: adequate  Airway patency: patent  Anesthetic complications: No anesthetic complications  PONV Status: none  Cardiovascular status: acceptable  Respiratory status: acceptable and face mask  Hydration status: acceptable    Comments: vsss resp spont, reflexes intact, responsive, report given to pacu nurse

## 2018-10-26 NOTE — ANESTHESIA PROCEDURE NOTES
Peripheral Block      Patient reassessed immediately prior to procedure    Patient location during procedure: pre-op  Start time: 10/26/2018 7:41 AM  Stop time: 10/26/2018 7:49 AM  Reason for block: at surgeon's request and post-op pain management  Performed by  CRNA: KITA SANCHEZ  Assisted by: ISIS RIZO  Preanesthetic Checklist  Completed: patient identified, site marked, surgical consent, pre-op evaluation, timeout performed, IV checked, risks and benefits discussed and monitors and equipment checked  Prep:  Pt Position: supine  Sterile barriers:cap, gloves, mask and sterile barriers  Prep: ChloraPrep and air dry greater than 3 min  Patient monitoring: blood pressure monitoring, continuous pulse oximetry and EKG  Procedure  Sedation:yes  Performed under: local infiltration  Guidance:ultrasound guided  ULTRASOUND INTERPRETATION.  Using ultrasound guidance a 21 G gauge needle was placed in close proximity to the sciatic nerve, at which point, under ultrasound guidance anesthetic was injected in the area of the nerve and spread of the anesthesia was seen on ultrasound in close proximity thereto.  There were no abnormalities seen on ultrasound; a digital image was taken; and the patient tolerated the procedure with no complications. Images:still images obtained  Loss of twitch: 1 (0.3 ma loss of twitch ) mA  Laterality:right  Block Type:popliteal  Injection Technique:single-shot  Needle Type:echogenic  Needle Gauge:21 G (21 g x 4 inch b wilcox)  Resistance on Injection: none  Medications  Comment:Marcaine 0.5% 25 ml with decadron 8 mg 2ml  Local Injected:bupivacaine 0.5% Local Amount Injected:27mL  Post Assessment  Injection Assessment: negative aspiration for heme, no paresthesia on injection and incremental injection  Patient Tolerance:comfortable throughout block  Complications:no

## 2018-10-26 NOTE — ANESTHESIA PROCEDURE NOTES
Airway  Urgency: elective    Airway not difficult    General Information and Staff    Patient location during procedure: OR  CRNA: ISIS RIZO    Indications and Patient Condition  Indications: management.    Preoxygenated: yes (3 min)  Mask difficulty assessment: 1 - vent by mask    Final Airway Details  Final airway type: supraglottic airway      Successful airway: classic  Size 4    Number of attempts at approach: 1    Additional Comments  Lma placed by standard fashion, cuff up with mov approx 30 ml with syringe on  balloon for pressure equalization, bbs and expansion =, + etco, tolerated without adverse rx.

## 2018-10-26 NOTE — ANESTHESIA PREPROCEDURE EVALUATION
Anesthesia Evaluation     Patient summary reviewed and Nursing notes reviewed   no history of anesthetic complications:  NPO Solid Status: > 8 hours  NPO Liquid Status: > 8 hours           Airway   Mallampati: II  TM distance: <3 FB  Neck ROM: full  no difficulty expected and Possible difficult intubation  Dental - normal exam     Pulmonary - normal exam   (+) a smoker Current Abstained day of surgery, COPD mild, shortness of breath, sleep apnea, rhonchi, wheezes,   Cardiovascular - normal exam    PT is on anticoagulation therapy  Patient on routine beta blocker and Beta blocker given within 24 hours of surgery  Rhythm: regular  Rate: normal    (+) hypertension, CAD ( high risk), cardiac stents (6 years ago??) Drug eluting stent more than 12 months ago hyperlipidemia,     ROS comment: Off Plavix x 5 days    Nuclear stress 2016  CONCLUSIONS:      1. Adequate stress test.     2. Baseline abnormal EKG with borderline EKG response to Lexiscan stress.     3. Normal LV systolic function (EF of 66%) with normal left ventricular     end-diastolic volume.     4. Moderate perfusion defect involving the anteroseptal and the anterior walls     of the left ventricle with reversibility on perfusion images during Lexiscan     stress.      Neuro/Psych  (+) headaches,     GI/Hepatic/Renal/Endo    (+) obesity,       Musculoskeletal     Abdominal  - normal exam  (+) obese,     Abdomen: soft.  Bowel sounds: normal.   Substance History   (+) alcohol use, drug use (THC)     OB/GYN negative ob/gyn ROS         Other        ROS/Med Hx Other: Labs reviewed  cxr nad  ekg sb st twave abnormality  Pt has extremely poor hygiene  2016 echo Adequate stress test.     2. Baseline abnormal EKG with borderline EKG response to Lexiscan stress.     3. Normal LV systolic function (EF of 66%) with normal left ventricular     end-diastolic volume.     4. Moderate perfusion defect involving the anteroseptal and the anterior walls     of the left ventricle  with reversibility on perfusion images during Lexiscan     stress.                  Anesthesia Plan    ASA 3     general   (Risks and benefits discussed including risk of aspiration, recall and dental damage. Discussed risk with pt., pt extremely high intraop and postop risk for cv, resp, and neuro events, All patient questions answered. Will continue with POC.    GA vs MAC with PNB  Popliteal and adductor canal single shot PNB  Risk vs Benefits discussed with patient to include infection, bleeding at the site, paresthesia, and incomplete analgesia.  )  intravenous induction   Anesthetic plan, all risks, benefits, and alternatives have been provided, discussed and informed consent has been obtained with: patient.

## 2018-10-26 NOTE — ANESTHESIA PROCEDURE NOTES
Peripheral Block      Patient reassessed immediately prior to procedure    Patient location during procedure: pre-op  Start time: 10/26/2018 7:50 AM  Stop time: 10/26/2018 7:56 AM  Reason for block: at surgeon's request and post-op pain management  Performed by  CRNA: KITA SANCHEZ  Assisted by: ISIS RIZO  Preanesthetic Checklist  Completed: patient identified, site marked, surgical consent, pre-op evaluation, timeout performed, IV checked, risks and benefits discussed and monitors and equipment checked  Prep:  Pt Position: supine  Sterile barriers:cap, gloves, mask and sterile barriers  Prep: ChloraPrep and air dry greater than 3 min  Patient monitoring: blood pressure monitoring, continuous pulse oximetry and EKG  Procedure  Sedation:yes  Performed under: local infiltration  Guidance:ultrasound guided  ULTRASOUND INTERPRETATION.  Using ultrasound guidance a 21 G gauge needle was placed in close proximity to the femoral nerve, at which point, under ultrasound guidance anesthetic was injected in the area of the nerve and spread of the anesthesia was seen on ultrasound in close proximity thereto.  There were no abnormalities seen on ultrasound; a digital image was taken; and the patient tolerated the procedure with no complications. Images:still images obtained    Laterality:right  Block Type:adductor canal block  Injection Technique:single-shot  Needle Type:echogenic  Needle Gauge:21 G (21g x 4 inch b wilcox new needle and kit used)  Resistance on Injection: none  Medications  Comment:Marcaine .25% 15 ml with decadron 4mg 1 ml  Local Injected:bupivacaine 0.25% Local Amount Injected:16mL  Post Assessment  Injection Assessment: negative aspiration for heme, no paresthesia on injection and incremental injection  Patient Tolerance:comfortable throughout block  Complications:no

## 2018-11-07 DIAGNOSIS — S82.841A CLOSED BIMALLEOLAR FRACTURE OF RIGHT ANKLE, INITIAL ENCOUNTER: Primary | ICD-10-CM

## 2018-11-08 ENCOUNTER — OFFICE VISIT (OUTPATIENT)
Dept: ORTHOPEDIC SURGERY | Facility: CLINIC | Age: 58
End: 2018-11-08

## 2018-11-08 VITALS — RESPIRATION RATE: 18 BRPM | WEIGHT: 225 LBS | BODY MASS INDEX: 36.16 KG/M2 | HEIGHT: 66 IN

## 2018-11-08 DIAGNOSIS — S82.841A CLOSED BIMALLEOLAR FRACTURE OF RIGHT ANKLE, INITIAL ENCOUNTER: Primary | ICD-10-CM

## 2018-11-08 DIAGNOSIS — Z86.39 HISTORY OF LOW POTASSIUM: ICD-10-CM

## 2018-11-08 PROCEDURE — 29580 STRAPPING UNNA BOOT: CPT | Performed by: PODIATRIST

## 2018-11-08 PROCEDURE — 99024 POSTOP FOLLOW-UP VISIT: CPT | Performed by: PODIATRIST

## 2018-11-08 NOTE — PROGRESS NOTES
Subjective   Patient ID: Valentina Theodore is a 58 y.o. female STATUS POST:  Post-op of the Right Ankle (ORIF 10/26/18)  She returns back today for her first postoperative visit status post ORIF of the right ankle.  Her splints intact and seems to be slightly worn and dirty on the distal aspect and all care present but overall seems to be intact.  She is utilizing knee scooter.    History of Present Illness    Review of Systems   Constitutional: Negative for diaphoresis, fever and unexpected weight change.   HENT: Negative for dental problem and sore throat.    Eyes: Negative for visual disturbance.   Respiratory: Negative for shortness of breath.    Cardiovascular: Negative for chest pain.   Gastrointestinal: Negative for abdominal pain, constipation, diarrhea, nausea and vomiting.   Genitourinary: Negative for difficulty urinating and frequency.   Musculoskeletal: Positive for arthralgias (RIght ankle).   Neurological: Negative for headaches.   Hematological: Does not bruise/bleed easily.   All other systems reviewed and are negative.      Past Medical History:   Diagnosis Date   • Ankle fracture     RIGHT ANKLE   • Chest pain     4 STENTS PLACED   • Coronary artery disease    • Hyperlipidemia    • Hypertension    • Left upper arm injury     FELL THROUGH PLATE GLASS WINDOW    • Piercing     EARS ONLY   • Scarring     LEFT ARM        Past Surgical History:   Procedure Laterality Date   • APPENDECTOMY     • ARM TENDON REPAIR Left 1976   • CARDIAC CATHETERIZATION      4 STENTS PLACED   • HAND RECONSTRUCTION      2-3 TIMES RIGHT   • THUMB ARTHROSCOPY Right    • TUBAL ABDOMINAL LIGATION     • WISDOM TOOTH EXTRACTION         Social History     Social History   • Marital status:      Spouse name: N/A   • Number of children: N/A   • Years of education: N/A     Occupational History   • Not on file.     Social History Main Topics   • Smoking status: Current Every Day Smoker     Packs/day: 2.00     Years: 43.00      Types: Cigarettes     Start date: 1976   • Smokeless tobacco: Never Used   • Alcohol use Yes      Comment: SOCIAL   • Drug use: Yes     Types: Marijuana      Comment: DAILY   • Sexual activity: Defer     Other Topics Concern   • Not on file     Social History Narrative   • No narrative on file       Ready to quit: No  Counseling given: Yes      I have reviewed all of the above social hx, family hx, surgical hx, medications, allergies & ROS and confirm that it is accurate.    No Known Allergies    Objective   Physical Exam   Constitutional: She is oriented to person, place, and time. She appears well-developed and well-nourished.   HENT:   Head: Normocephalic and atraumatic.   Eyes: Pupils are equal, round, and reactive to light. EOM are normal.   Neck: Normal range of motion.   Pulmonary/Chest: Effort normal.   Musculoskeletal: Normal range of motion.   Neurological: She is alert and oriented to person, place, and time. She has normal reflexes.   Skin: Skin is warm.   Psychiatric: She has a normal mood and affect. Her behavior is normal. Judgment and thought content normal.   Nursing note and vitals reviewed.    Ortho Exam  Ortho Exam right lower extremity exam:  Incisions are clean dry and intact, well coapted, no sign of dehiscence  Renetta are intact and no sign of infection is present  Pulses palpable, pedal hair noted, 1+ edema noted.  No ecchymosis noted.  Graft is intact the lateral incision.  Everything seems to be healing well and is expected.  She does have some active range of motion of the ankle and digits.  She is able to slightly tolerate passive range of motion.  Calf is soft.    Assessment/Plan  status post 2 weeks from ORIF of right ankle fracture  Independent Review of Radiographic Studies:      Laboratory and Other Studies:     Medical Decision Making:        Procedures  [] Unna boot was placed on the right lower extremity from the toes to the knee followed by her postoperative splint with  deanna.    There are no diagnoses linked to this encounter.      Recommendations/Plan:  She seems to be healing well but just on the outside chance that she does encounter swelling I have kept her in a splint for a 1 more week to allow for swelling but also to keep her staples in and ensure the wound is healed well.  She is to remain nonweightbearing to the right lower extremity.  Continue ice and elevation behind the right knee.  Utilize her knee scooter.  Follow-up 1 week and plan for hopeful staple removal and cast application at that point.    No Follow-up on file.  Patient agreeable to call or return sooner for any concerns.

## 2018-11-20 ENCOUNTER — OFFICE VISIT (OUTPATIENT)
Dept: ORTHOPEDIC SURGERY | Facility: CLINIC | Age: 58
End: 2018-11-20

## 2018-11-20 VITALS — WEIGHT: 225 LBS | RESPIRATION RATE: 18 BRPM | BODY MASS INDEX: 36.16 KG/M2 | HEIGHT: 66 IN

## 2018-11-20 DIAGNOSIS — Z09 FOLLOW UP: Primary | ICD-10-CM

## 2018-11-20 DIAGNOSIS — S82.841A CLOSED BIMALLEOLAR FRACTURE OF RIGHT ANKLE, INITIAL ENCOUNTER: ICD-10-CM

## 2018-11-20 PROCEDURE — 99024 POSTOP FOLLOW-UP VISIT: CPT | Performed by: PODIATRIST

## 2018-11-20 NOTE — PROGRESS NOTES
Subjective   Patient ID: Valentina Theodore is a 58 y.o. female STATUS POST:  Post-op of the Right Ankle (Bimalleolar fracture ORIF 10/26/18)  Returns today for her second postoperative visit.  Splint intact yet dirty.  She is utilizing a knee scooter.  Denies complaints.    History of Present Illness    Review of Systems   Musculoskeletal: Negative.    Skin: Negative.        Past Medical History:   Diagnosis Date   • Ankle fracture     RIGHT ANKLE   • Chest pain     4 STENTS PLACED   • Coronary artery disease    • Hyperlipidemia    • Hypertension    • Left upper arm injury     FELL THROUGH PLATE GLASS WINDOW    • Piercing     EARS ONLY   • Scarring     LEFT ARM        Past Surgical History:   Procedure Laterality Date   • APPENDECTOMY     • ARM TENDON REPAIR Left 1976   • CARDIAC CATHETERIZATION      4 STENTS PLACED   • HAND RECONSTRUCTION      2-3 TIMES RIGHT   • THUMB ARTHROSCOPY Right    • TUBAL ABDOMINAL LIGATION     • WISDOM TOOTH EXTRACTION         Social History     Socioeconomic History   • Marital status:      Spouse name: Not on file   • Number of children: Not on file   • Years of education: Not on file   • Highest education level: Not on file   Social Needs   • Financial resource strain: Not on file   • Food insecurity - worry: Not on file   • Food insecurity - inability: Not on file   • Transportation needs - medical: Not on file   • Transportation needs - non-medical: Not on file   Occupational History   • Not on file   Tobacco Use   • Smoking status: Current Every Day Smoker     Packs/day: 2.00     Years: 43.00     Pack years: 86.00     Types: Cigarettes     Start date: 1976   • Smokeless tobacco: Never Used   Substance and Sexual Activity   • Alcohol use: Yes     Comment: SOCIAL   • Drug use: Yes     Types: Marijuana     Comment: DAILY   • Sexual activity: Defer   Other Topics Concern   • Not on file   Social History Narrative   • Not on file       Ready to quit: No  Counseling given:  Yes      I have reviewed all of the above social hx, family hx, surgical hx, medications, allergies & ROS and confirm that it is accurate.    No Known Allergies    Objective   Physical Exam   Constitutional: She is oriented to person, place, and time. She appears well-developed and well-nourished.   HENT:   Head: Normocephalic and atraumatic.   Eyes: EOM are normal. Pupils are equal, round, and reactive to light.   Neck: Normal range of motion.   Cardiovascular: Normal rate.   Pulmonary/Chest: Effort normal.   Musculoskeletal: Normal range of motion.   Neurological: She is alert and oriented to person, place, and time. She has normal reflexes.   Skin: Skin is warm.   Psychiatric: She has a normal mood and affect. Her behavior is normal. Judgment and thought content normal.   Nursing note and vitals reviewed.    Ortho Exam  [unfilled]  Lower extremity exam:  Incisions are clean dry and intact, well coapted, no sign of dehiscence  Renetta are intact and no sign of infection is present  Edema and bruising and ecchymosis are minimal.  Incisions are well coapted.  Graft intact laterally.  No sign of infection.  She is grossly neurovascular intact.  She is able to move her ankle as well as digits.    Assessment/Plan  status post 3 weeks from ORIF of right bimalleolar ankle fracture  Independent Review of Radiographic Studies:      Laboratory and Other Studies:     Medical Decision Making:        Procedures  She is placed in a well-padded well molded short leg fiberglass cast for the right lower extremity      Recommendations/Plan:  The patient's staples were removed and Steri-Strips and Mastisol were applied.  They were placed in a well-padded, well molded, fiberglass cast.  They are instructed to keep the cast clean dry and intact.  They are not to stick anything in the cast or get it wet.  They're to remain nonweightbearing with crutches, walker, wheelchair, or knee scooter.  They can call me with any concerns and will  follow up in 3 weeks for cast off x-rays.  Continue elevation and ice behind the knee.  Continue a knee scooter.      No Follow-up on file.  Patient agreeable to call or return sooner for any concerns.

## 2018-12-10 DIAGNOSIS — S82.841A CLOSED BIMALLEOLAR FRACTURE OF RIGHT ANKLE, INITIAL ENCOUNTER: Primary | ICD-10-CM

## 2018-12-11 ENCOUNTER — OFFICE VISIT (OUTPATIENT)
Dept: ORTHOPEDIC SURGERY | Facility: CLINIC | Age: 58
End: 2018-12-11

## 2018-12-11 VITALS — RESPIRATION RATE: 18 BRPM | HEIGHT: 66 IN | WEIGHT: 225 LBS | BODY MASS INDEX: 36.16 KG/M2

## 2018-12-11 DIAGNOSIS — Z09 FOLLOW UP: ICD-10-CM

## 2018-12-11 DIAGNOSIS — S82.841A CLOSED BIMALLEOLAR FRACTURE OF RIGHT ANKLE, INITIAL ENCOUNTER: Primary | ICD-10-CM

## 2018-12-11 PROCEDURE — 99024 POSTOP FOLLOW-UP VISIT: CPT | Performed by: PODIATRIST

## 2018-12-11 NOTE — PROGRESS NOTES
Subjective   Patient ID: Valentina Theodore is a 58 y.o. female STATUS POST:  Post-op of the Right Ankle (10/26/18: BIMALLEOLAR FRACTURE OPEN REDUCTION INTERNAL FIXATION)  She comes back in for follow-up today on her right ankle fracture.  She complains of no pain.  Her cast was intact and she is utilizing her knee scooter.    History of Present Illness    Review of Systems   Constitutional: Negative for diaphoresis, fever and unexpected weight change.   HENT: Negative for dental problem and sore throat.    Eyes: Negative for visual disturbance.   Respiratory: Negative for shortness of breath.    Cardiovascular: Negative for chest pain.   Gastrointestinal: Negative for abdominal pain, constipation, diarrhea, nausea and vomiting.   Genitourinary: Negative for difficulty urinating and frequency.   Musculoskeletal: Positive for arthralgias.   Neurological: Negative for headaches.   Hematological: Does not bruise/bleed easily.   All other systems reviewed and are negative.      Past Medical History:   Diagnosis Date   • Ankle fracture     RIGHT ANKLE   • Chest pain     4 STENTS PLACED   • Coronary artery disease    • Hyperlipidemia    • Hypertension    • Left upper arm injury     FELL THROUGH PLATE GLASS WINDOW    • Piercing     EARS ONLY   • Scarring     LEFT ARM        Past Surgical History:   Procedure Laterality Date   • APPENDECTOMY     • ARM TENDON REPAIR Left 1976   • CARDIAC CATHETERIZATION      4 STENTS PLACED   • HAND RECONSTRUCTION      2-3 TIMES RIGHT   • THUMB ARTHROSCOPY Right    • TUBAL ABDOMINAL LIGATION     • WISDOM TOOTH EXTRACTION         Social History     Socioeconomic History   • Marital status:      Spouse name: Not on file   • Number of children: Not on file   • Years of education: Not on file   • Highest education level: Not on file   Social Needs   • Financial resource strain: Not on file   • Food insecurity - worry: Not on file   • Food insecurity - inability: Not on file   •  Transportation needs - medical: Not on file   • Transportation needs - non-medical: Not on file   Occupational History   • Not on file   Tobacco Use   • Smoking status: Current Every Day Smoker     Packs/day: 2.00     Years: 43.00     Pack years: 86.00     Types: Cigarettes     Start date: 1976   • Smokeless tobacco: Never Used   Substance and Sexual Activity   • Alcohol use: Yes     Comment: SOCIAL   • Drug use: Yes     Types: Marijuana     Comment: DAILY   • Sexual activity: Defer   Other Topics Concern   • Not on file   Social History Narrative   • Not on file       Ready to quit: No  Counseling given: Yes      I have reviewed all of the above social hx, family hx, surgical hx, medications, allergies & ROS and confirm that it is accurate.    No Known Allergies    Objective   Physical Exam  Ortho Exam  Right lower extremity exam: Minimal edema noted, pulses palpable, pedal hair noted, capillary refill time brisk.  Gross sensation and protective sensation intact.  Skin is extremely dry and scaly and flaky from being casted.  She has no pain.  Incisions are healed well with scab.  No sign of infection.  She has good range of motion of the ankle with no crepitus.  I can dorsiflex her to roughly 5° and plantarflexion is 30°.      Assessment/Plan  status post 6 weeks from ORIF of right bimalleolar ankle fracture  Independent Review of Radiographic Studies:      Laboratory and Other Studies:     Medical Decision Making:        Procedures  [] No procedures were performed in office today.    There are no diagnoses linked to this encounter.      Recommendations/Plan:  We will transition her to a fracture boot.  She may begin driving but needs to remove the boot for driving.  I instructed her on range of motion exercises and she is given handouts and instructions.  Tubigrip for swelling.  Ice compression and elevation as needed.  Anti-inflammatories as needed.  Follow-up in 2-3 weeks and may consider transition to brace at  that point.    No Follow-up on file.  Patient agreeable to call or return sooner for any concerns.

## 2019-01-02 DIAGNOSIS — S82.841A CLOSED BIMALLEOLAR FRACTURE OF RIGHT ANKLE, INITIAL ENCOUNTER: Primary | ICD-10-CM

## 2019-01-03 ENCOUNTER — OFFICE VISIT (OUTPATIENT)
Dept: ORTHOPEDIC SURGERY | Facility: CLINIC | Age: 59
End: 2019-01-03

## 2019-01-03 VITALS — BODY MASS INDEX: 36.16 KG/M2 | RESPIRATION RATE: 18 BRPM | WEIGHT: 225 LBS | HEIGHT: 66 IN

## 2019-01-03 DIAGNOSIS — S82.841A CLOSED BIMALLEOLAR FRACTURE OF RIGHT ANKLE, INITIAL ENCOUNTER: Primary | ICD-10-CM

## 2019-01-03 PROCEDURE — 99024 POSTOP FOLLOW-UP VISIT: CPT | Performed by: PODIATRIST

## 2019-01-03 NOTE — PROGRESS NOTES
Subjective   Patient ID: Valentina Theodore is a 58 y.o. female STATUS POST:  Post-op of the Right Ankle (ORIF bimalleolar fracture 10/26/18)  she comes back in today for a follow-up on the right ankle.  She is wearing her boot.  She states it is not hurting or bothering her and she has no complaints.    History of Present Illness    Review of Systems   Constitutional: Negative.    Respiratory: Negative.    Musculoskeletal: Negative.    Skin: Negative.        Past Medical History:   Diagnosis Date   • Ankle fracture     RIGHT ANKLE   • Chest pain     4 STENTS PLACED   • Coronary artery disease    • Hyperlipidemia    • Hypertension    • Left upper arm injury     FELL THROUGH PLATE GLASS WINDOW    • Piercing     EARS ONLY   • Scarring     LEFT ARM        Past Surgical History:   Procedure Laterality Date   • ANKLE OPEN REDUCTION INTERNAL FIXATION Right 10/26/2018    Procedure: BIMALLEOLAR FRACTURE OPEN REDUCTION INTERNAL FIXATION;  Surgeon: Genaro Diaz DPM;  Location: [facility];  Service: Podiatry   • APPENDECTOMY     • ARM TENDON REPAIR Left 1976   • CARDIAC CATHETERIZATION      4 STENTS PLACED   • HAND RECONSTRUCTION      2-3 TIMES RIGHT   • THUMB ARTHROSCOPY Right    • TUBAL ABDOMINAL LIGATION     • WISDOM TOOTH EXTRACTION         Social History     Socioeconomic History   • Marital status:      Spouse name: Not on file   • Number of children: Not on file   • Years of education: Not on file   • Highest education level: Not on file   Social Needs   • Financial resource strain: Not on file   • Food insecurity - worry: Not on file   • Food insecurity - inability: Not on file   • Transportation needs - medical: Not on file   • Transportation needs - non-medical: Not on file   Occupational History   • Not on file   Tobacco Use   • Smoking status: Current Every Day Smoker     Packs/day: 2.00     Years: 43.00     Pack years: 86.00     Types: Cigarettes     Start date: 1976   • Smokeless tobacco: Never Used    Substance and Sexual Activity   • Alcohol use: Yes     Comment: SOCIAL   • Drug use: Yes     Types: Marijuana     Comment: DAILY   • Sexual activity: Defer   Other Topics Concern   • Not on file   Social History Narrative   • Not on file       Ready to quit: No  Counseling given: Yes      I have reviewed all of the above social hx, family hx, surgical hx, medications, allergies & ROS and confirm that it is accurate.    No Known Allergies    Objective   Physical Exam   Constitutional: She is oriented to person, place, and time. She appears well-developed and well-nourished.   HENT:   Head: Normocephalic and atraumatic.   Eyes: EOM are normal. Pupils are equal, round, and reactive to light.   Neck: Normal range of motion.   Cardiovascular: Normal rate.   Pulmonary/Chest: Effort normal.   Musculoskeletal: Normal range of motion.   Neurological: She is alert and oriented to person, place, and time. She has normal reflexes.   Skin: Skin is warm.   Psychiatric: She has a normal mood and affect. Her behavior is normal. Judgment and thought content normal.   Nursing note and vitals reviewed.    Ortho Exam  Right lower extremity exam: She is grossly neurovascular intact.  Pulses palpable.  Pedal hair noted.  No sign of infection.  Incisions healed well.  Ankle joint dorsiflexion is 3°.  Plantarflexion of 25°.  No increased inversion or eversion.  No crepitus.  No pain with range of motion.      Assessment/Plan  status post 9 weeks status post ORIF of bimalleolar right ankle fracture  Independent Review of Radiographic Studies:      Laboratory and Other Studies:     Medical Decision Making:        Procedures  [] No procedures were performed in office today.    There are no diagnoses linked to this encounter.      Recommendations/Plan:  She will transition from boot brace.  We discussed rest, ice, compression, elevation in the high compression sock underneath the brace and anti-inflammatories as needed for swelling.  I do  not plan on her needing a formal physical therapy at this point but if she develops any setbacks or issues she can let me know.  Follow-up next month in [place] for x-rays.    No Follow-up on file.  Patient agreeable to call or return sooner for any concerns.

## 2019-02-04 DIAGNOSIS — S82.841A CLOSED BIMALLEOLAR FRACTURE OF RIGHT ANKLE, INITIAL ENCOUNTER: Primary | ICD-10-CM

## 2019-02-05 ENCOUNTER — HOSPITAL ENCOUNTER (OUTPATIENT)
Dept: GENERAL RADIOLOGY | Facility: HOSPITAL | Age: 59
Discharge: HOME | End: 2019-02-05
Payer: MEDICARE

## 2019-02-05 ENCOUNTER — OFFICE VISIT (OUTPATIENT)
Dept: ORTHOPEDIC SURGERY | Facility: CLINIC | Age: 59
End: 2019-02-05

## 2019-02-05 ENCOUNTER — HOSPITAL ENCOUNTER (OUTPATIENT)
Facility: HOSPITAL | Age: 59
Discharge: HOME | End: 2019-02-05
Payer: MEDICARE

## 2019-02-05 VITALS — WEIGHT: 208 LBS | BODY MASS INDEX: 33.43 KG/M2 | HEIGHT: 66 IN | RESPIRATION RATE: 18 BRPM

## 2019-02-05 DIAGNOSIS — M19.079 ARTHRITIS OF ANKLE JOINT: ICD-10-CM

## 2019-02-05 DIAGNOSIS — S82.841A CLOSED BIMALLEOLAR FRACTURE OF RIGHT ANKLE, INITIAL ENCOUNTER: ICD-10-CM

## 2019-02-05 DIAGNOSIS — S82.841A CLOSED BIMALLEOLAR FRACTURE OF RIGHT ANKLE, INITIAL ENCOUNTER: Primary | ICD-10-CM

## 2019-02-05 PROCEDURE — 73610 X-RAY EXAM OF ANKLE: CPT

## 2019-02-05 PROCEDURE — 99213 OFFICE O/P EST LOW 20 MIN: CPT | Performed by: PODIATRIST

## 2019-02-05 NOTE — PROGRESS NOTES
Subjective   Patient ID: Valentina Theodore is a 58 y.o. female   Follow-up of the Right Ankle (ORIF bimalleolar fracture 10/26/18)  Comes back in for follow-up today on her right ankle.  She is now just over 3 months status post ORIF.  She says she still wearing her brace but it's not so much because it hurts, she says this is because she is afraid of breaking her ankle again.  She is wearing normal shoes and says her swelling is improved and she has no significant issues other than an occasional pain on the inside of the ankle.    History of Present Illness                                                   Review of Systems   Constitutional: Negative.    HENT: Negative.    Respiratory: Negative.    Gastrointestinal: Negative.    Skin: Negative.        Past Medical History:   Diagnosis Date   • Ankle fracture     RIGHT ANKLE   • Chest pain     4 STENTS PLACED   • Coronary artery disease    • Hyperlipidemia    • Hypertension    • Left upper arm injury     FELL THROUGH PLATE GLASS WINDOW    • Piercing     EARS ONLY   • Scarring     LEFT ARM        Past Surgical History:   Procedure Laterality Date   • ANKLE OPEN REDUCTION INTERNAL FIXATION Right 10/26/2018    Procedure: BIMALLEOLAR FRACTURE OPEN REDUCTION INTERNAL FIXATION;  Surgeon: Genaro Diaz DPM;  Location: [facility];  Service: Podiatry   • APPENDECTOMY     • ARM TENDON REPAIR Left 1976   • CARDIAC CATHETERIZATION      4 STENTS PLACED   • HAND RECONSTRUCTION      2-3 TIMES RIGHT   • THUMB ARTHROSCOPY Right    • TUBAL ABDOMINAL LIGATION     • WISDOM TOOTH EXTRACTION         No Known Allergies      Current Outpatient Medications:   •  amitriptyline (ELAVIL) 25 MG tablet, Take 25 mg by mouth Every Night., Disp: , Rfl:   •  amLODIPine (NORVASC) 5 MG tablet, Take 5 mg by mouth., Disp: , Rfl:   •  busPIRone (BUSPAR) 5 MG tablet, Take 5 mg by mouth 3 (Three) Times a Day., Disp: , Rfl:   •  carvedilol (COREG) 6.25 MG tablet, Take 6.25 mg by mouth 2 (Two) Times a Day  With Meals., Disp: , Rfl:   •  chlorthalidone (HYGROTON) 25 MG tablet, Take 25 mg by mouth Daily., Disp: , Rfl:   •  clopidogrel (PLAVIX) 75 MG tablet, Take 75 mg by mouth., Disp: , Rfl:   •  HYDROcodone-acetaminophen (NORCO) 5-325 MG per tablet, Take 1 tablet by mouth Every 6 (Six) Hours As Needed., Disp: , Rfl:   •  loratadine (CLARITIN) 10 MG tablet, Take 10 mg by mouth Daily., Disp: , Rfl:   •  losartan (COZAAR) 100 MG tablet, , Disp: , Rfl:   •  oxyCODONE-acetaminophen (PERCOCET) 7.5-325 MG per tablet, Take 1 tablet by mouth Every 6 (Six) Hours As Needed for pain, Disp: 30 tablet, Rfl: 0  •  potassium chloride (K-DUR,KLOR-CON) 20 MEQ CR tablet, Take 20 mEq by mouth Daily., Disp: , Rfl:   •  sertraline (ZOLOFT) 50 MG tablet, Take 50 mg by mouth Daily., Disp: , Rfl:   •  simvastatin (ZOCOR) 20 MG tablet, Take 20 mg by mouth Every Night., Disp: , Rfl:   •  SM ASPIRIN ADULT LOW STRENGTH 81 MG EC tablet, Take 81 mg by mouth Daily., Disp: , Rfl:   •  sulfamethoxazole-trimethoprim (BACTRIM DS) 800-160 MG per tablet, Take 1 tablet by mouth 2 (Two) Times a Day., Disp: 20 tablet, Rfl: 0  •  traMADol (ULTRAM) 50 MG tablet, Take 1 tablet by mouth Every 6 (Six) Hours As Needed for Moderate Pain ., Disp: 30 tablet, Rfl: 0    Family History   Problem Relation Age of Onset   • Diabetes Mother        Social History     Socioeconomic History   • Marital status:      Spouse name: Not on file   • Number of children: Not on file   • Years of education: Not on file   • Highest education level: Not on file   Social Needs   • Financial resource strain: Not on file   • Food insecurity - worry: Not on file   • Food insecurity - inability: Not on file   • Transportation needs - medical: Not on file   • Transportation needs - non-medical: Not on file   Occupational History   • Not on file   Tobacco Use   • Smoking status: Current Every Day Smoker     Packs/day: 2.00     Years: 43.00     Pack years: 86.00     Types: Cigarettes      Start date: 1976   • Smokeless tobacco: Never Used   Substance and Sexual Activity   • Alcohol use: Yes     Comment: SOCIAL   • Drug use: Yes     Types: Marijuana     Comment: DAILY   • Sexual activity: Defer   Other Topics Concern   • Not on file   Social History Narrative   • Not on file       Ready to quit: No  Counseling given: Yes       I have reviewed all of the above social hx, family hx, surgical hx, medications, allergies & ROS and confirm that it is accurate.  Objective   Physical Exam   Constitutional: She is oriented to person, place, and time. She appears well-developed and well-nourished.   HENT:   Head: Normocephalic and atraumatic.   Eyes: EOM are normal. Pupils are equal, round, and reactive to light.   Neck: Normal range of motion.   Cardiovascular: Normal rate.   Pulmonary/Chest: Effort normal.   Musculoskeletal: Normal range of motion.   Neurological: She is alert and oriented to person, place, and time. She has normal reflexes.   Skin: Skin is warm.   Psychiatric: She has a normal mood and affect. Her behavior is normal. Judgment and thought content normal.   Nursing note and vitals reviewed.    Ortho Exam  [unfilled]  Right  Lower extremity exam:  Vascular: Pulses palpable, pedal hair faintly noted, CFT brisk, no edema noted  Neuro: Gross sensation intact  Derm: Normal turgor and temperature, no wounds or sores or lesions.  Incisions all healed well.  MSK: Joint range of motion normal, manual muscle testing normal, no pain with range of motion.  She has good dorsiflexion and plantarflexion as well as stability the ankle with no crepitus.  The most anterior portion of the medial malleolus and adjacent portion of the medial gutter shows some slight tenderness to palpation and she states that's when her occasional pain is when it does hurt.        Assessment/Plan 3 months status post ORIF of right bimalleolar ankle fracture  Independent Review of Radiographic Studies:    Three-view x-rays of the  right ankle taken at [facility] today and compared to previous postoperative films.  Fibula is healed well and well aligned.  Hardware intact.  Ankle mortise is symmetric.  Screws in the medial malleolus seem to be still intact and buried well and bone.  Fracture seems to be healed although there is a slightly sclerotic line about the fracture site don't think this is clinically significant.  On the lateral view there is a small portion which was a small fracture off the anterior most aspect of the medial malleolus and this could be correlated clinically with her area of pain.  Laboratory and Other Studies:     Medical Decision Making:        Procedures  [] No procedures were performed in office today.    There are no diagnoses linked to this encounter.      Recommendations/Plan:  She can continue anti-inflammatories as needed.  I explained to her that the fracture seem to be well healed so the brace is not necessarily needed unless she feels like she needs it for support.  She is free to perform any and all activities as tolerated.  Shoe gear as tolerated with no restrictions.  I did discuss with her that occasional aches and pains can still be normal at this point but if the pain continues and is still present after the next 1-2 months of like to look at this again and see if we can further evaluate.  The screw seemed to be buried well in bone and no screw be problematic or it could be there still a small wilda of unhealed fracture.    No Follow-up on file.  Patient agreeable to call or return sooner for any concerns.

## 2019-04-17 ENCOUNTER — HOSPITAL ENCOUNTER (OUTPATIENT)
Dept: MAMMOGRAPHY | Facility: HOSPITAL | Age: 59
Discharge: HOME | End: 2019-04-17
Payer: MEDICARE

## 2019-04-17 ENCOUNTER — HOSPITAL ENCOUNTER (OUTPATIENT)
Dept: CT IMAGING | Facility: HOSPITAL | Age: 59
Discharge: HOME | End: 2019-04-17
Payer: MEDICARE

## 2019-04-17 DIAGNOSIS — F17.218 CIGARETTE NICOTINE DEPENDENCE WITH OTHER NICOTINE-INDUCED DISORDER: ICD-10-CM

## 2019-04-17 DIAGNOSIS — Z12.39 BREAST CANCER SCREENING: ICD-10-CM

## 2019-04-17 PROCEDURE — G0297 LDCT FOR LUNG CA SCREEN: HCPCS

## 2019-04-17 PROCEDURE — 77067 SCR MAMMO BI INCL CAD: CPT

## 2019-05-10 ENCOUNTER — HOSPITAL ENCOUNTER (EMERGENCY)
Facility: HOSPITAL | Age: 59
Discharge: HOME | End: 2019-05-10
Attending: EMERGENCY MEDICINE | Admitting: EMERGENCY MEDICINE

## 2019-05-10 ENCOUNTER — APPOINTMENT (OUTPATIENT)
Dept: GENERAL RADIOLOGY | Facility: HOSPITAL | Age: 59
End: 2019-05-10

## 2019-05-10 VITALS
HEART RATE: 60 BPM | SYSTOLIC BLOOD PRESSURE: 105 MMHG | HEIGHT: 65 IN | WEIGHT: 206 LBS | RESPIRATION RATE: 18 BRPM | BODY MASS INDEX: 34.32 KG/M2 | OXYGEN SATURATION: 98 % | DIASTOLIC BLOOD PRESSURE: 74 MMHG | TEMPERATURE: 97.7 F

## 2019-05-10 DIAGNOSIS — N39.0 URINARY TRACT INFECTION WITHOUT HEMATURIA, SITE UNSPECIFIED: ICD-10-CM

## 2019-05-10 DIAGNOSIS — E87.6 HYPOKALEMIA: Primary | ICD-10-CM

## 2019-05-10 LAB
ALBUMIN SERPL-MCNC: 4.2 G/DL (ref 3.5–5)
ALBUMIN/GLOB SERPL: 1.4 G/DL (ref 1–2)
ALP SERPL-CCNC: 56 U/L (ref 38–126)
ALT SERPL W P-5'-P-CCNC: 35 U/L (ref 13–69)
ANION GAP SERPL CALCULATED.3IONS-SCNC: 12.8 MMOL/L (ref 10–20)
AST SERPL-CCNC: 30 U/L (ref 15–46)
BACTERIA UR QL AUTO: ABNORMAL /HPF
BASOPHILS # BLD AUTO: 0.04 10*3/MM3 (ref 0–0.2)
BASOPHILS NFR BLD AUTO: 0.5 % (ref 0–1.5)
BILIRUB SERPL-MCNC: 0.3 MG/DL (ref 0.2–1.3)
BILIRUB UR QL STRIP: NEGATIVE
BUN BLD-MCNC: 22 MG/DL (ref 7–20)
BUN/CREAT SERPL: 22 (ref 7.1–23.5)
CALCIUM SPEC-SCNC: 9.6 MG/DL (ref 8.4–10.2)
CHLORIDE SERPL-SCNC: 106 MMOL/L (ref 98–107)
CLARITY UR: ABNORMAL
CO2 SERPL-SCNC: 26 MMOL/L (ref 26–30)
COLOR UR: YELLOW
CREAT BLD-MCNC: 1 MG/DL (ref 0.6–1.3)
D DIMER PPP FEU-MCNC: 0.44 MCGFEU/ML (ref 0–0.57)
DEPRECATED RDW RBC AUTO: 45.1 FL (ref 37–54)
EOSINOPHIL # BLD AUTO: 0.37 10*3/MM3 (ref 0–0.4)
EOSINOPHIL NFR BLD AUTO: 4.7 % (ref 0.3–6.2)
ERYTHROCYTE [DISTWIDTH] IN BLOOD BY AUTOMATED COUNT: 13.2 % (ref 12.3–15.4)
GFR SERPL CREATININE-BSD FRML MDRD: 57 ML/MIN/1.73
GLOBULIN UR ELPH-MCNC: 3 GM/DL
GLUCOSE BLD-MCNC: 125 MG/DL (ref 74–98)
GLUCOSE UR STRIP-MCNC: NEGATIVE MG/DL
HCT VFR BLD AUTO: 44.4 % (ref 34–46.6)
HGB BLD-MCNC: 15.2 G/DL (ref 12–15.9)
HGB UR QL STRIP.AUTO: ABNORMAL
HOLD SPECIMEN: NORMAL
HYALINE CASTS UR QL AUTO: ABNORMAL /LPF
IMM GRANULOCYTES # BLD AUTO: 0.03 10*3/MM3 (ref 0–0.05)
IMM GRANULOCYTES NFR BLD AUTO: 0.4 % (ref 0–0.5)
KETONES UR QL STRIP: ABNORMAL
LEUKOCYTE ESTERASE UR QL STRIP.AUTO: ABNORMAL
LYMPHOCYTES # BLD AUTO: 2.72 10*3/MM3 (ref 0.7–3.1)
LYMPHOCYTES NFR BLD AUTO: 34.7 % (ref 19.6–45.3)
MCH RBC QN AUTO: 31.8 PG (ref 26.6–33)
MCHC RBC AUTO-ENTMCNC: 34.2 G/DL (ref 31.5–35.7)
MCV RBC AUTO: 92.9 FL (ref 79–97)
MONOCYTES # BLD AUTO: 0.61 10*3/MM3 (ref 0.1–0.9)
MONOCYTES NFR BLD AUTO: 7.8 % (ref 5–12)
NEUTROPHILS # BLD AUTO: 4.06 10*3/MM3 (ref 1.7–7)
NEUTROPHILS NFR BLD AUTO: 51.9 % (ref 42.7–76)
NITRITE UR QL STRIP: POSITIVE
NRBC BLD AUTO-RTO: 0 /100 WBC (ref 0–0.2)
NT-PROBNP SERPL-MCNC: 159 PG/ML (ref 0–125)
PH UR STRIP.AUTO: <=5 [PH] (ref 5–8)
PLATELET # BLD AUTO: 211 10*3/MM3 (ref 140–450)
PMV BLD AUTO: 10.4 FL (ref 6–12)
POTASSIUM BLD-SCNC: 2.8 MMOL/L (ref 3.5–5.1)
PROT SERPL-MCNC: 7.2 G/DL (ref 6.3–8.2)
PROT UR QL STRIP: NEGATIVE
RBC # BLD AUTO: 4.78 10*6/MM3 (ref 3.77–5.28)
RBC # UR: ABNORMAL /HPF
REF LAB TEST METHOD: ABNORMAL
SODIUM BLD-SCNC: 142 MMOL/L (ref 137–145)
SP GR UR STRIP: 1.03 (ref 1–1.03)
SQUAMOUS #/AREA URNS HPF: ABNORMAL /HPF
TROPONIN I SERPL-MCNC: 0.01 NG/ML (ref 0–0.05)
TROPONIN I SERPL-MCNC: <0.012 NG/ML (ref 0–0.03)
TROPONIN I SERPL-MCNC: <0.012 NG/ML (ref 0–0.03)
UROBILINOGEN UR QL STRIP: ABNORMAL
WBC NRBC COR # BLD: 7.83 10*3/MM3 (ref 3.4–10.8)
WBC UR QL AUTO: ABNORMAL /HPF
WHOLE BLOOD HOLD SPECIMEN: NORMAL
WHOLE BLOOD HOLD SPECIMEN: NORMAL

## 2019-05-10 PROCEDURE — 81001 URINALYSIS AUTO W/SCOPE: CPT | Performed by: PHYSICIAN ASSISTANT

## 2019-05-10 PROCEDURE — 71045 X-RAY EXAM CHEST 1 VIEW: CPT

## 2019-05-10 PROCEDURE — 80053 COMPREHEN METABOLIC PANEL: CPT

## 2019-05-10 PROCEDURE — 93005 ELECTROCARDIOGRAM TRACING: CPT

## 2019-05-10 PROCEDURE — 83880 ASSAY OF NATRIURETIC PEPTIDE: CPT | Performed by: PHYSICIAN ASSISTANT

## 2019-05-10 PROCEDURE — 25010000003 POTASSIUM CHLORIDE 10 MEQ/100ML SOLUTION: Performed by: PHYSICIAN ASSISTANT

## 2019-05-10 PROCEDURE — 84484 ASSAY OF TROPONIN QUANT: CPT

## 2019-05-10 PROCEDURE — 96365 THER/PROPH/DIAG IV INF INIT: CPT

## 2019-05-10 PROCEDURE — 84484 ASSAY OF TROPONIN QUANT: CPT | Performed by: PHYSICIAN ASSISTANT

## 2019-05-10 PROCEDURE — 99284 EMERGENCY DEPT VISIT MOD MDM: CPT

## 2019-05-10 PROCEDURE — 85379 FIBRIN DEGRADATION QUANT: CPT | Performed by: PHYSICIAN ASSISTANT

## 2019-05-10 PROCEDURE — 85025 COMPLETE CBC W/AUTO DIFF WBC: CPT

## 2019-05-10 RX ORDER — SODIUM CHLORIDE 0.9 % (FLUSH) 0.9 %
10 SYRINGE (ML) INJECTION AS NEEDED
Status: DISCONTINUED | OUTPATIENT
Start: 2019-05-10 | End: 2019-05-11 | Stop reason: HOSPADM

## 2019-05-10 RX ORDER — CEPHALEXIN 500 MG/1
500 CAPSULE ORAL 3 TIMES DAILY
Qty: 15 CAPSULE | Refills: 0 | Status: SHIPPED | OUTPATIENT
Start: 2019-05-10 | End: 2019-05-15

## 2019-05-10 RX ORDER — CEPHALEXIN 250 MG/1
500 CAPSULE ORAL ONCE
Status: COMPLETED | OUTPATIENT
Start: 2019-05-10 | End: 2019-05-10

## 2019-05-10 RX ORDER — ASPIRIN 81 MG/1
243 TABLET, CHEWABLE ORAL ONCE
Status: COMPLETED | OUTPATIENT
Start: 2019-05-10 | End: 2019-05-10

## 2019-05-10 RX ORDER — POTASSIUM CHLORIDE 7.45 MG/ML
10 INJECTION INTRAVENOUS ONCE
Status: COMPLETED | OUTPATIENT
Start: 2019-05-10 | End: 2019-05-10

## 2019-05-10 RX ORDER — POTASSIUM CHLORIDE 750 MG/1
40 CAPSULE, EXTENDED RELEASE ORAL DAILY
Status: DISCONTINUED | OUTPATIENT
Start: 2019-05-10 | End: 2019-05-11 | Stop reason: HOSPADM

## 2019-05-10 RX ADMIN — POTASSIUM CHLORIDE 10 MEQ: 7.46 INJECTION, SOLUTION INTRAVENOUS at 19:14

## 2019-05-10 RX ADMIN — ASPIRIN 81 MG 243 MG: 81 TABLET ORAL at 18:26

## 2019-05-10 RX ADMIN — Medication 400 MG: at 22:24

## 2019-05-10 RX ADMIN — POTASSIUM CHLORIDE 40 MEQ: 750 CAPSULE, EXTENDED RELEASE ORAL at 19:14

## 2019-05-10 RX ADMIN — CEPHALEXIN 500 MG: 250 CAPSULE ORAL at 22:24

## 2019-05-10 RX ADMIN — SODIUM CHLORIDE 1000 ML: 9 INJECTION, SOLUTION INTRAVENOUS at 19:14

## 2020-05-19 ENCOUNTER — HOSPITAL ENCOUNTER (OUTPATIENT)
Dept: MAMMOGRAPHY | Facility: HOSPITAL | Age: 60
Discharge: HOME | End: 2020-05-19
Payer: MEDICARE

## 2020-05-19 PROCEDURE — 77067 SCR MAMMO BI INCL CAD: CPT

## 2020-08-07 ENCOUNTER — HOSPITAL ENCOUNTER (OUTPATIENT)
Dept: ULTRASOUND IMAGING | Facility: HOSPITAL | Age: 60
Discharge: HOME | End: 2020-08-07
Payer: MEDICARE

## 2020-08-07 PROCEDURE — 76830 TRANSVAGINAL US NON-OB: CPT

## 2020-12-21 ENCOUNTER — CONSULT (OUTPATIENT)
Dept: CARDIOLOGY | Facility: CLINIC | Age: 60
End: 2020-12-21

## 2020-12-21 VITALS
BODY MASS INDEX: 35.65 KG/M2 | HEART RATE: 73 BPM | OXYGEN SATURATION: 96 % | DIASTOLIC BLOOD PRESSURE: 62 MMHG | WEIGHT: 214 LBS | HEIGHT: 65 IN | SYSTOLIC BLOOD PRESSURE: 94 MMHG

## 2020-12-21 DIAGNOSIS — Z72.0 TOBACCO ABUSE: ICD-10-CM

## 2020-12-21 DIAGNOSIS — I25.10 CORONARY ARTERY DISEASE INVOLVING NATIVE CORONARY ARTERY OF NATIVE HEART WITHOUT ANGINA PECTORIS: Primary | ICD-10-CM

## 2020-12-21 DIAGNOSIS — Z72.0 TOBACCO USE: ICD-10-CM

## 2020-12-21 DIAGNOSIS — E78.5 DYSLIPIDEMIA: ICD-10-CM

## 2020-12-21 DIAGNOSIS — I10 ESSENTIAL HYPERTENSION: ICD-10-CM

## 2020-12-21 PROCEDURE — 93000 ELECTROCARDIOGRAM COMPLETE: CPT | Performed by: INTERNAL MEDICINE

## 2020-12-21 PROCEDURE — 99204 OFFICE O/P NEW MOD 45 MIN: CPT | Performed by: INTERNAL MEDICINE

## 2020-12-21 RX ORDER — SENNOSIDES 8.6 MG
650 CAPSULE ORAL EVERY 8 HOURS PRN
COMMUNITY

## 2020-12-21 NOTE — PROGRESS NOTES
"    Subjective:     Encounter Date:12/21/2020      Patient ID: Valentina Theodore is a 60 y.o. female.    Chief Complaint: Coronary artery disease  HPI  This is a 60-year-old female patient who presents to cardiology clinic to establish care after her previous cardiologist changed jobs.  The patient indicates that she had 3 stents placed in 2018 by Dr. Judge at [facility].  She was told that she had 1 stent placed in each of her 3 coronary arteries at the same setting for unstable angina.  She was told that one stent was then the \" maker\" location.  She has no prior history of myocardial infarction.  She indicates that her stents was placed after presenting to the hospital with worsening chest discomfort.  She indicates that she has had no such chest discomfort since her coronary revascularization in 2013.  The patient has no chest discomfort at rest or with activity.  There is no exertional chest arm neck jaw shoulder or back discomfort.  There is no orthopnea PND or lower extremity edema.  There is no dizziness palpitations or syncope.  She denies shortness of breath at rest or with activity.  She reports compliance with her medications with no perceived side effects.  She is currently smoking up to 1 pack of cigarettes per day.  The following portions of the patient's history were reviewed and updated as appropriate: allergies, current medications, past family history, past medical history, past social history, past surgical history and problem  Review of Systems   Constitution: Negative for chills, diaphoresis, fever, malaise/fatigue, weight gain and weight loss.   HENT: Negative for ear discharge, hearing loss, hoarse voice and nosebleeds.    Eyes: Negative for discharge, double vision, pain and photophobia.   Cardiovascular: Negative for chest pain, claudication, cyanosis, dyspnea on exertion, irregular heartbeat, leg swelling, near-syncope, orthopnea, palpitations, paroxysmal nocturnal dyspnea " and syncope.   Respiratory: Negative for cough, hemoptysis, shortness of breath, sputum production and wheezing.    Endocrine: Negative for cold intolerance, heat intolerance, polydipsia, polyphagia and polyuria.   Hematologic/Lymphatic: Negative for adenopathy and bleeding problem. Does not bruise/bleed easily.   Skin: Negative for color change, flushing, itching and rash.   Musculoskeletal: Negative for muscle cramps, muscle weakness, myalgias and stiffness.   Gastrointestinal: Negative for abdominal pain, diarrhea, hematemesis, hematochezia, nausea and vomiting.   Genitourinary: Negative for dysuria, frequency and nocturia.   Neurological: Negative for focal weakness, loss of balance, numbness, paresthesias and seizures.   Psychiatric/Behavioral: Negative for altered mental status, hallucinations and suicidal ideas.   Allergic/Immunologic: Negative for HIV exposure, hives and persistent infections.           Current Outpatient Medications:   •  acetaminophen (TYLENOL) 650 MG 8 hr tablet, Take 650 mg by mouth Every 8 (Eight) Hours As Needed for Mild Pain ., Disp: , Rfl:   •  amitriptyline (ELAVIL) 25 MG tablet, Take 10 mg by mouth Every Night., Disp: , Rfl:   •  amLODIPine (NORVASC) 5 MG tablet, Take 5 mg by mouth., Disp: , Rfl:   •  busPIRone (BUSPAR) 5 MG tablet, Take 5 mg by mouth 3 (Three) Times a Day., Disp: , Rfl:   •  carvedilol (COREG) 6.25 MG tablet, Take 6.25 mg by mouth 2 (Two) Times a Day With Meals., Disp: , Rfl:   •  chlorthalidone (HYGROTON) 25 MG tablet, Take 25 mg by mouth Daily., Disp: , Rfl:   •  clopidogrel (PLAVIX) 75 MG tablet, Take 75 mg by mouth., Disp: , Rfl:   •  Cyanocobalamin (VITAMIN B 12 PO), Take  by mouth., Disp: , Rfl:   •  Fluticasone Propionate (FLONASE NA), into the nostril(s) as directed by provider., Disp: , Rfl:   •  loratadine (CLARITIN) 10 MG tablet, Take 10 mg by mouth Daily., Disp: , Rfl:   •  losartan (COZAAR) 100 MG tablet, , Disp: , Rfl:   •  potassium chloride  "(K-DUR,KLOR-CON) 20 MEQ CR tablet, Take 20 mEq by mouth Daily., Disp: , Rfl:   •  sertraline (ZOLOFT) 50 MG tablet, Take 50 mg by mouth Daily., Disp: , Rfl:   •  simvastatin (ZOCOR) 20 MG tablet, Take 20 mg by mouth Every Night., Disp: , Rfl:   •  SM ASPIRIN ADULT LOW STRENGTH 81 MG EC tablet, Take 81 mg by mouth Daily., Disp: , Rfl:   •  sulfamethoxazole-trimethoprim (BACTRIM DS) 800-160 MG per tablet, Take 1 tablet by mouth 2 (Two) Times a Day., Disp: 20 tablet, Rfl: 0  •  traMADol (ULTRAM) 50 MG tablet, Take 1 tablet by mouth Every 6 (Six) Hours As Needed for Moderate Pain ., Disp: 30 tablet, Rfl: 0    Objective:   Vitals signs and nursing note reviewed.   Constitutional:       Appearance: Healthy appearance. Not in distress.   Neck:      Vascular: No JVR. JVD normal.   Pulmonary:      Effort: Pulmonary effort is normal.      Breath sounds: Normal breath sounds. No wheezing. No rhonchi. No rales.   Chest:      Chest wall: Not tender to palpatation.   Cardiovascular:      PMI at left midclavicular line. Normal rate. Regular rhythm. Normal S1. Normal S2.      Murmurs: There is no murmur.      No gallop. No click. No rub.   Pulses:     Intact distal pulses.   Edema:     Peripheral edema absent.   Abdominal:      General: Bowel sounds are normal.      Palpations: Abdomen is soft.      Tenderness: There is no abdominal tenderness.   Musculoskeletal: Normal range of motion.         General: No tenderness.   Skin:     General: Skin is warm and dry.   Neurological:      General: No focal deficit present.      Mental Status: Alert and oriented to person, place and time.       Blood pressure 94/62, pulse 73, height 165.1 cm (65\"), weight 97.1 kg (214 lb), SpO2 96 %.   Lab Review:     Assessment:       1. Coronary artery disease involving native coronary artery of native heart without angina pectoris  No recurrent angina after multivessel catheter-based revascularization with active lifestyle.    2. Dyslipidemia  This is " followed closely by the patient's primary care provider.  Goal LDL cholesterol should be less than 70 mg/dL.    3. Essential hypertension  Acceptable blood pressure control.    4. Tobacco abuse  Ongoing daily tobacco use.          ECG 12 Lead    Date/Time: 12/21/2020 3:58 PM  Performed by: Howard Jerry MD  Authorized by: Howard Jerry MD   Previous ECG: no previous ECG available  Rhythm: sinus rhythm  Rate: normal  QRS axis: normal  Other findings: non-specific ST-T wave changes and poor R wave progression    Clinical impression: abnormal EKG            Plan:     The patient has been counseled regarding the essential need to discontinue cigarette smoking.  No changes in her medications have been made at today's visit.  No additional cardiovascular testing is warranted at this time.

## 2021-06-24 ENCOUNTER — HOSPITAL ENCOUNTER (OUTPATIENT)
Dept: MAMMOGRAPHY | Facility: HOSPITAL | Age: 61
Discharge: HOME | End: 2021-06-24
Payer: MEDICARE

## 2021-06-24 DIAGNOSIS — Z12.31 ENCOUNTER FOR SCREENING MAMMOGRAM FOR MALIGNANT NEOPLASM OF BREAST: ICD-10-CM

## 2021-06-24 PROCEDURE — 77067 SCR MAMMO BI INCL CAD: CPT

## 2022-03-09 ENCOUNTER — OFFICE VISIT (OUTPATIENT)
Dept: CARDIOLOGY | Facility: CLINIC | Age: 62
End: 2022-03-09

## 2022-03-09 VITALS
OXYGEN SATURATION: 97 % | SYSTOLIC BLOOD PRESSURE: 120 MMHG | HEART RATE: 82 BPM | HEIGHT: 65 IN | BODY MASS INDEX: 39.82 KG/M2 | WEIGHT: 239 LBS | DIASTOLIC BLOOD PRESSURE: 82 MMHG

## 2022-03-09 DIAGNOSIS — Z72.0 TOBACCO USE: Primary | ICD-10-CM

## 2022-03-09 DIAGNOSIS — I25.10 CORONARY ARTERY DISEASE INVOLVING NATIVE CORONARY ARTERY OF NATIVE HEART WITHOUT ANGINA PECTORIS: ICD-10-CM

## 2022-03-09 DIAGNOSIS — J43.2 CENTRILOBULAR EMPHYSEMA: ICD-10-CM

## 2022-03-09 DIAGNOSIS — E66.01 MORBID OBESITY: ICD-10-CM

## 2022-03-09 DIAGNOSIS — E78.5 DYSLIPIDEMIA: ICD-10-CM

## 2022-03-09 DIAGNOSIS — I10 ESSENTIAL HYPERTENSION: ICD-10-CM

## 2022-03-09 PROCEDURE — 99213 OFFICE O/P EST LOW 20 MIN: CPT | Performed by: INTERNAL MEDICINE

## 2022-03-09 RX ORDER — NITROGLYCERIN 0.4 MG/1
0.4 TABLET SUBLINGUAL
COMMUNITY

## 2022-03-09 NOTE — PROGRESS NOTES
Subjective:     Encounter Date:03/09/2022      Patient ID: Valentina Theodore is a 61 y.o. female.    Chief Complaint: Coronary artery disease  HPI  This is a 61-year-old female patient who presents to cardiology clinic for routine follow-up.  Since her last visit she has had no active cardiovascular issues, symptoms or hospitalizations.  She reports compliance with her medications with no perceived side effects.  Unfortunately she continues to smoke up to 1 pack cigarettes per day.  The following portions of the patient's history were reviewed and updated as appropriate: allergies, current medications, past family history, past medical history, past social history, past surgical history and problem  Review of Systems   Constitutional: Negative for chills, diaphoresis, fever, malaise/fatigue, weight gain and weight loss.   HENT: Negative for ear discharge, hearing loss, hoarse voice and nosebleeds.    Eyes: Negative for discharge, double vision, pain and photophobia.   Cardiovascular: Negative for chest pain, claudication, cyanosis, dyspnea on exertion, irregular heartbeat, leg swelling, near-syncope, orthopnea, palpitations, paroxysmal nocturnal dyspnea and syncope.   Respiratory: Negative for cough, hemoptysis, shortness of breath, sputum production and wheezing.    Endocrine: Negative for cold intolerance, heat intolerance, polydipsia, polyphagia and polyuria.   Hematologic/Lymphatic: Negative for adenopathy and bleeding problem. Does not bruise/bleed easily.   Skin: Negative for color change, flushing, itching and rash.   Musculoskeletal: Negative for muscle cramps, muscle weakness, myalgias and stiffness.   Gastrointestinal: Negative for abdominal pain, diarrhea, hematemesis, hematochezia, nausea and vomiting.   Genitourinary: Negative for dysuria, frequency and nocturia.   Neurological: Negative for focal weakness, loss of balance, numbness, paresthesias and seizures.   Psychiatric/Behavioral: Negative for  altered mental status, hallucinations and suicidal ideas.   Allergic/Immunologic: Negative for HIV exposure, hives and persistent infections.           Current Outpatient Medications:   •  acetaminophen (TYLENOL) 650 MG 8 hr tablet, Take 650 mg by mouth Every 8 (Eight) Hours As Needed for Mild Pain ., Disp: , Rfl:   •  amitriptyline (ELAVIL) 25 MG tablet, Take 10 mg by mouth Every Night., Disp: , Rfl:   •  amLODIPine (NORVASC) 5 MG tablet, Take 5 mg by mouth., Disp: , Rfl:   •  busPIRone (BUSPAR) 5 MG tablet, Take 5 mg by mouth 3 (Three) Times a Day., Disp: , Rfl:   •  carvedilol (COREG) 6.25 MG tablet, Take 6.25 mg by mouth 2 (Two) Times a Day With Meals., Disp: , Rfl:   •  chlorthalidone (HYGROTON) 25 MG tablet, Take 25 mg by mouth Daily., Disp: , Rfl:   •  clopidogrel (PLAVIX) 75 MG tablet, Take 75 mg by mouth., Disp: , Rfl:   •  Fluticasone Propionate (FLONASE NA), into the nostril(s) as directed by provider., Disp: , Rfl:   •  losartan (COZAAR) 100 MG tablet, , Disp: , Rfl:   •  potassium chloride (K-DUR,KLOR-CON) 20 MEQ CR tablet, Take 20 mEq by mouth Daily., Disp: , Rfl:   •  sertraline (ZOLOFT) 50 MG tablet, Take 50 mg by mouth Daily., Disp: , Rfl:   •  simvastatin (ZOCOR) 20 MG tablet, Take 20 mg by mouth Every Night., Disp: , Rfl:   •  SM ASPIRIN ADULT LOW STRENGTH 81 MG EC tablet, Take 81 mg by mouth Daily., Disp: , Rfl:   •  Cyanocobalamin (VITAMIN B 12 PO), Take  by mouth., Disp: , Rfl:   •  loratadine (CLARITIN) 10 MG tablet, Take 10 mg by mouth Daily., Disp: , Rfl:   •  nitroglycerin (NITROSTAT) 0.4 MG SL tablet, Place 0.4 mg under the tongue., Disp: , Rfl:     Objective:   Vitals and nursing note reviewed.   Constitutional:       Appearance: Healthy appearance. Not in distress.   Neck:      Vascular: No JVR. JVD normal.   Pulmonary:      Effort: Pulmonary effort is normal.      Breath sounds: Normal breath sounds. No wheezing. No rhonchi. No rales.   Chest:      Chest wall: Not tender to palpatation.  "  Cardiovascular:      PMI at left midclavicular line. Normal rate. Regular rhythm. Normal S1. Normal S2.      Murmurs: There is no murmur.      No gallop. No click. No rub.   Pulses:     Intact distal pulses.   Edema:     Peripheral edema absent.   Abdominal:      General: Bowel sounds are normal.      Palpations: Abdomen is soft.      Tenderness: There is no abdominal tenderness.   Musculoskeletal: Normal range of motion.         General: No tenderness. Skin:     General: Skin is warm and dry.   Neurological:      General: No focal deficit present.      Mental Status: Alert and oriented to person, place and time.       Blood pressure 120/82, pulse 82, height 165.1 cm (65\"), weight 108 kg (239 lb), SpO2 97 %.   Lab Review:     Assessment:       1. Tobacco use  Daily cigarette smoking of at least 1 pack/day    2. Coronary artery disease involving native coronary artery of native heart without angina pectoris  Stable and angina free    3. Dyslipidemia  Goal LDL cholesterol should be less than 70 mg/dL.  Management deferred to PCP.    4. Essential hypertension  Acceptable blood pressure control.    5. Centrilobular emphysema (HCC)  Typical tobacco related lung disease.  Stable symptoms.    6. Morbid obesity (HCC)  Body mass index is just under 40.  The obesity pattern is central.  This is due to excess calorie intake.  There is evidence of multiple comorbidities.    Procedures    Plan:     No changes in medications have been made at today's visit.  No further cardiovascular testing is indicated.  The patient has been counseled regarding the essential need to discontinue cigarette smoking.      "

## 2022-03-18 ENCOUNTER — HOSPITAL ENCOUNTER (OUTPATIENT)
Dept: CT IMAGING | Facility: HOSPITAL | Age: 62
Discharge: HOME | End: 2022-03-18
Payer: COMMERCIAL

## 2022-03-18 DIAGNOSIS — F17.218 NICOTINE DEPENDENCE, CIGARETTES, WITH OTHER NICOTINE-INDUCED DISORDERS: ICD-10-CM

## 2022-03-18 PROCEDURE — 71271 CT THORAX LUNG CANCER SCR C-: CPT

## 2022-03-23 ENCOUNTER — HOSPITAL ENCOUNTER (OUTPATIENT)
Dept: ULTRASOUND IMAGING | Facility: HOSPITAL | Age: 62
Discharge: HOME | End: 2022-03-23
Payer: COMMERCIAL

## 2022-03-23 DIAGNOSIS — E04.9 ENLARGEMENT OF THYROID: ICD-10-CM

## 2022-03-23 PROCEDURE — 76536 US EXAM OF HEAD AND NECK: CPT

## 2022-05-19 ENCOUNTER — OFFICE VISIT (OUTPATIENT)
Dept: PULMONOLOGY | Facility: CLINIC | Age: 62
End: 2022-05-19

## 2022-05-19 VITALS
HEIGHT: 65 IN | BODY MASS INDEX: 40.15 KG/M2 | OXYGEN SATURATION: 95 % | HEART RATE: 64 BPM | RESPIRATION RATE: 18 BRPM | WEIGHT: 241 LBS | DIASTOLIC BLOOD PRESSURE: 80 MMHG | SYSTOLIC BLOOD PRESSURE: 114 MMHG | TEMPERATURE: 97.2 F

## 2022-05-19 DIAGNOSIS — R06.02 SHORTNESS OF BREATH: ICD-10-CM

## 2022-05-19 DIAGNOSIS — G47.19 EXCESSIVE DAYTIME SLEEPINESS: ICD-10-CM

## 2022-05-19 DIAGNOSIS — E66.01 MORBID OBESITY, UNSPECIFIED OBESITY TYPE: ICD-10-CM

## 2022-05-19 DIAGNOSIS — J44.9 CHRONIC OBSTRUCTIVE PULMONARY DISEASE, UNSPECIFIED COPD TYPE: ICD-10-CM

## 2022-05-19 DIAGNOSIS — G47.52 DREAM ENACTMENT BEHAVIOR: ICD-10-CM

## 2022-05-19 DIAGNOSIS — J41.0 CHRONIC BRONCHITIS, SIMPLE: ICD-10-CM

## 2022-05-19 DIAGNOSIS — R06.83 SNORING: ICD-10-CM

## 2022-05-19 DIAGNOSIS — F17.200 SMOKING: ICD-10-CM

## 2022-05-19 DIAGNOSIS — G47.8 SLEEP TALKING: ICD-10-CM

## 2022-05-19 DIAGNOSIS — G47.33 OBSTRUCTIVE SLEEP APNEA: Primary | ICD-10-CM

## 2022-05-19 PROCEDURE — 99204 OFFICE O/P NEW MOD 45 MIN: CPT | Performed by: INTERNAL MEDICINE

## 2022-05-19 NOTE — PROGRESS NOTES
CONSULT NOTE    Requested by:   Alexia Bauer, *   Alexia Bauer, ANTON      Chief Complaint   Patient presents with   • Sleeping Problem     Constant fatigue       Subjective:  Valentina Theodore is a 61 y.o. female.     History of Present Illness   Patient came in today for evaluation of possible sleep apnea. Patient endorses loud snoring. she also says that she has been tired and has fatigue during the day, for the past few years. The patient's family notes that she has occasional pauses in the breathing & she occasionally wakes up gasping for breath.     The patient says that she rarely gets restful night sleep and her quality has diminished considerably. she does have a tendency to feel sleepy while watching TV and reading a book.      The patient has been told that she occasionally talks in her sleep. she also occasionally acts out her dreams.    she is not complaining of occasional headaches.     Patient suffers from hypertension & CAD. she drinks 5-6 cups/cans of caffeinated drinks per day.    Upon questioning she is complaining of shortness of breath. Patient says that for the past few years, she has had shortness of breath. Patient has had decreased exercise capacity that has been progressive for the past few years. Patient also says that she brings up phlegm in the morning along with cough.     Patient also notes having progressive worsening in her ability to walk up the hill or walk up a flight of stairs.     she is currently not on oxygen.     Patient reports smoking 1-2 packs per day for the past 42 years.  she is currently smoking 1 pack a day.    Patient does have a family history of lung diseases, including COPD in her mother and lung cancer in her father.      The following portions of the patient's history were reviewed and updated as appropriate: allergies, current medications, past family history, past medical history, past social history and past surgical history.    Review of  "Systems   Constitutional: Negative for chills.   HENT: Negative for sinus pressure.    Respiratory: Positive for wheezing.    Psychiatric/Behavioral: Positive for sleep disturbance.   All other systems reviewed and are negative.      Past Medical History:   Diagnosis Date   • Ankle fracture     RIGHT ANKLE   • Arthritis    • Chest pain     4 STENTS PLACED   • Coronary artery disease    • Fractures    • Heart disease    • Hyperlipidemia    • Hypertension    • Left upper arm injury     FELL THROUGH PLATE GLASS WINDOW    • Piercing     EARS ONLY   • Scarring     LEFT ARM       Social History     Tobacco Use   • Smoking status: Current Every Day Smoker     Packs/day: 2.00     Years: 43.00     Pack years: 86.00     Types: Cigarettes     Start date: 1976   • Smokeless tobacco: Never Used   Substance Use Topics   • Alcohol use: Yes     Comment: SOCIAL         Objective:  Visit Vitals  /80 (BP Location: Right arm, Patient Position: Sitting, Cuff Size: Adult)   Pulse 64   Temp 97.2 °F (36.2 °C)   Resp 18   Ht 165.1 cm (65\")   Wt 109 kg (241 lb)   SpO2 95%   BMI 40.10 kg/m²       Physical Exam  Vitals reviewed.   Constitutional:       Appearance: She is well-developed.   HENT:      Head: Atraumatic.      Mouth/Throat:      Comments: Oropharynx was crowded.  Eyes:      Pupils: Pupils are equal, round, and reactive to light.   Neck:      Thyroid: No thyromegaly.      Vascular: No JVD.      Trachea: No tracheal deviation.      Comments: Increased neck circumference noted.  Cardiovascular:      Rate and Rhythm: Normal rate and regular rhythm.   Pulmonary:      Effort: Pulmonary effort is normal. No respiratory distress.      Comments: Somewhat hyperresonant to percussion.  Somewhat decreased air entry.  Mild scattered wheezing noted.   Musculoskeletal:      Cervical back: Neck supple.      Right lower leg: No edema.      Left lower leg: No edema.      Comments: Gait was intact.   Skin:     General: Skin is warm and dry. "   Neurological:      Mental Status: She is alert and oriented to person, place, and time.   Psychiatric:         Mood and Affect: Mood normal.         Behavior: Behavior normal.           Assessment/Plan:  Diagnoses and all orders for this visit:    1. Obstructive sleep apnea (Primary)  -     Polysomnography 4 or More Parameters; Future  -     COVID PRE-OP / PRE-PROCEDURE SCREENING ORDER (NO ISOLATION) - Swab, Nasopharynx; Future    2. Snoring  -     Polysomnography 4 or More Parameters; Future  -     COVID PRE-OP / PRE-PROCEDURE SCREENING ORDER (NO ISOLATION) - Swab, Nasopharynx; Future    3. Excessive daytime sleepiness  -     Polysomnography 4 or More Parameters; Future    4. Morbid obesity, unspecified obesity type (HCC)  -     Polysomnography 4 or More Parameters; Future    5. Sleep talking  -     Polysomnography 4 or More Parameters; Future    6. Dream enactment behavior  -     Polysomnography 4 or More Parameters; Future    7. Shortness of breath  -     Pulmonary Function Test; Future    8. Chronic obstructive pulmonary disease, unspecified COPD type (HCC)  -     Pulmonary Function Test; Future  -     Polysomnography 4 or More Parameters; Future    9. Chronic bronchitis, simple (HCC)  -     Pulmonary Function Test; Future    10. Smoking    Other orders  -     tiotropium bromide-olodaterol (STIOLTO RESPIMAT) 2.5-2.5 MCG/ACT aerosol solution inhaler; Inhale 2 puffs Daily.  Dispense: 1 each; Refill: 5        Return in about 15 weeks (around 9/1/2022) for Recheck, Sleep study, PFT F/U, For Yun (Satish), ...Also 8 mths w/Yun.    DISCUSSION(if any):  Last chest x-ray was reviewed personally and the results were shared with the patient.  Images reviewed personally.   Results for orders placed during the hospital encounter of 05/10/19    XR Chest 1 View    Narrative  PROCEDURE: XR CHEST 1 VW-    HISTORY: Chest Pain Triage Protocol      COMPARISON: 10/16/2018.    FINDINGS:  The heart size is normal. The  mediastinum is normal. No acute  pulmonary abnormality is identified. There is no pneumothorax. The bony  thorax in intact.    Impression  No acute cardiopulmonary process.      This report was finalized on 5/11/2019 7:15 AM by Bryant Collado MD.    Reviewed her latest CT, from March 18th, 2022.  No suspicious findings noted.       I have also reviewed her provider's office note that mentions hypertension.  It also mentions atherosclerotic heart disease and sleep disturbance.  Smoking and tobacco dependence was listed as well.    ===========================  ===========================    PFTs were ordered for follow up visit.     Laboratory workup was also reviewed which showed   Lab Results   Component Value Date    HGB 15.2 05/10/2019    HGB 15.3 10/16/2018   ,    Lab Results   Component Value Date    EOSABS 0.37 05/10/2019    EOSABS 0.35 10/16/2018    & Laboratory workup also showed   Lab Results   Component Value Date    CO2 26.0 05/10/2019     ===========================  ===========================    Sleep questionnaire was provided to the patient    The pathophysiology of sleep apnea was discussed, with her.     We will encourage her to schedule the sleep study soon.     The patient will definitely need to be considered for an in lab study due to dream enactment, sleep talking & COPD among other reasons.  It should be noted that a home sleep study is likely to underestimate the true AHI and is unable to assess sleep stages and abnormal sleep behaviors etc. The patient has understood.    The patient is agreeable to try CPAP/BiPAP, if needed.     Patient was educated on good sleep hygiene measures and voiced understanding of the same.     Orders as above.    I have told the patient, that in my view, shortness of breath is most likely from underlying chronic obstructive lung disease.     Patient was given education and demonstration on how to use the medicine.     Side effects, of prescribed medicines,  discussed.    Patient was also instructed on compliance and adherence with instructions.     I have discussed the need to quit smoking as soon as possible.    Patient was offered modalities such as Chantix/nicotine patches/Wellbutrin to aid in smoking cessation.    The patient will get back to us regarding the choice, once a decision has been taken.     Patient was given reading material, as appropriate.     The patient belongs to the risk group for which lung cancer screening has been recommended. She will need it yearly.    Patient was asked to call with any concerns.     Patient will be followed clinically to assess for response to treatment and further recommendations will be made, based on response.      Dictated utilizing Dragon dictation.    This document was electronically signed by Luis Enrique Erwin MD on 05/19/22 at 10:13 EDT

## 2022-05-24 ENCOUNTER — OFFICE VISIT (OUTPATIENT)
Dept: OBSTETRICS AND GYNECOLOGY | Facility: CLINIC | Age: 62
End: 2022-05-24

## 2022-05-24 VITALS
BODY MASS INDEX: 38.44 KG/M2 | DIASTOLIC BLOOD PRESSURE: 82 MMHG | WEIGHT: 239.2 LBS | HEIGHT: 66 IN | SYSTOLIC BLOOD PRESSURE: 118 MMHG

## 2022-05-24 DIAGNOSIS — R20.0 NUMBNESS OF PERINEUM: ICD-10-CM

## 2022-05-24 DIAGNOSIS — R10.2 PELVIC PRESSURE IN FEMALE: Primary | ICD-10-CM

## 2022-05-24 DIAGNOSIS — D25.9 UTERINE LEIOMYOMA, UNSPECIFIED LOCATION: ICD-10-CM

## 2022-05-24 DIAGNOSIS — N83.292 COMPLEX CYST OF LEFT OVARY: ICD-10-CM

## 2022-05-24 DIAGNOSIS — N81.11 CYSTOCELE, MIDLINE: ICD-10-CM

## 2022-05-24 PROCEDURE — 99203 OFFICE O/P NEW LOW 30 MIN: CPT | Performed by: PHYSICIAN ASSISTANT

## 2022-05-24 NOTE — PATIENT INSTRUCTIONS
Follow up 2 weeks for pelvic ultrasound and pending results discuss further management of symptoms

## 2022-05-24 NOTE — PROGRESS NOTES
Subjective   Chief Complaint   Patient presents with   • Vaginal Pain     Patient has been referred by PCP for vaginal pain, pressure and numbness       Valentina Theodore is a 61 y.o. year old  presenting to be seen for complaint of vaginal and pelvic pressure and numbness on the right labia and right side of the vagina.  Symptoms have been present for about 2 years.  Patient reports she had also previously been experiencing some vaginal pain however this has resolved.  Her last menstrual period was 8 years ago  Patient has had pelvic ultrasound in 2020 per her PCP that noted a 3.5 cm right uterine fundal myoma and also a complex cyst on the left ovary. Additional imaging with contrast CT or MRI was recommended but patient has not had any follow up   She reports a normal mammogram 2 years ago by her PCP.  She had a normal screening mammogram 2021.    Past Medical History:   Diagnosis Date   • Abnormal ECG    • Abnormal Pap smear of cervix     Precancerous cells   • Ankle fracture     RIGHT ANKLE   • Arthritis    • Chest pain     4 STENTS PLACED   • Chlamydia    • Coronary artery disease    • Depression    • Fractures    • Heart disease    • History of transfusion    • Hyperlipidemia    • Hypertension    • Left upper arm injury     FELL THROUGH PLATE GLASS WINDOW    • Migraine    • Ovarian cyst    • Piercing     EARS ONLY   • Preeclampsia    • Scarring     LEFT ARM   • Substance abuse (HCC)    • Varicella         Current Outpatient Medications:   •  acetaminophen (TYLENOL) 650 MG 8 hr tablet, Take 650 mg by mouth Every 8 (Eight) Hours As Needed for Mild Pain ., Disp: , Rfl:   •  amitriptyline (ELAVIL) 25 MG tablet, Take 10 mg by mouth Every Night., Disp: , Rfl:   •  amLODIPine (NORVASC) 5 MG tablet, Take 5 mg by mouth., Disp: , Rfl:   •  busPIRone (BUSPAR) 5 MG tablet, Take 5 mg by mouth 3 (Three) Times a Day., Disp: , Rfl:   •  carvedilol (COREG) 6.25 MG tablet, Take 6.25  mg by mouth 2 (Two) Times a Day With Meals., Disp: , Rfl:   •  chlorthalidone (HYGROTON) 25 MG tablet, Take 25 mg by mouth Daily., Disp: , Rfl:   •  clopidogrel (PLAVIX) 75 MG tablet, Take 75 mg by mouth., Disp: , Rfl:   •  Fluticasone Propionate (FLONASE NA), into the nostril(s) as directed by provider., Disp: , Rfl:   •  loratadine (CLARITIN) 10 MG tablet, Take 10 mg by mouth Daily., Disp: , Rfl:   •  losartan (COZAAR) 100 MG tablet, , Disp: , Rfl:   •  nitroglycerin (NITROSTAT) 0.4 MG SL tablet, Place 0.4 mg under the tongue., Disp: , Rfl:   •  potassium chloride (K-DUR,KLOR-CON) 20 MEQ CR tablet, Take 20 mEq by mouth Daily., Disp: , Rfl:   •  sertraline (ZOLOFT) 50 MG tablet, Take 50 mg by mouth Daily., Disp: , Rfl:   •  simvastatin (ZOCOR) 20 MG tablet, Take 20 mg by mouth Every Night., Disp: , Rfl:   •  SM ASPIRIN ADULT LOW STRENGTH 81 MG EC tablet, Take 81 mg by mouth Daily., Disp: , Rfl:   •  tiotropium bromide-olodaterol (STIOLTO RESPIMAT) 2.5-2.5 MCG/ACT aerosol solution inhaler, Inhale 2 puffs Daily., Disp: 1 each, Rfl: 5   No Known Allergies   Past Surgical History:   Procedure Laterality Date   • ANKLE OPEN REDUCTION INTERNAL FIXATION Right 10/26/2018    Procedure: BIMALLEOLAR FRACTURE OPEN REDUCTION INTERNAL FIXATION;  Surgeon: Genaro Diaz DPM;  Location: [facility];  Service: Podiatry   • APPENDECTOMY     • ARM TENDON REPAIR Left 1976   • CARDIAC CATHETERIZATION      4 STENTS PLACED   • D & C WITH SUCTION  1987   • HAND RECONSTRUCTION      2-3 TIMES RIGHT   • THUMB ARTHROSCOPY Right    • TUBAL ABDOMINAL LIGATION     • WISDOM TOOTH EXTRACTION        Social History     Socioeconomic History   • Marital status:    Tobacco Use   • Smoking status: Current Every Day Smoker     Packs/day: 2.00     Years: 43.00     Pack years: 86.00     Types: Cigarettes     Start date: 1/1/1976   • Smokeless tobacco: Never Used   Vaping Use   • Vaping Use: Never used   Substance and Sexual Activity   • Alcohol  "use: Yes     Comment: Social drinking   • Drug use: Yes     Types: Marijuana     Comment: DAILY   • Sexual activity: Not Currently     Birth control/protection: Abstinence, Post-menopausal      Family History   Problem Relation Age of Onset   • Diabetes Mother    • Cancer Father    • Melanoma Father    • Stroke Maternal Grandfather    • Breast cancer Maternal Aunt    • Breast cancer Maternal Aunt    • Breast cancer Maternal Aunt    • Uterine cancer Daughter    • Melanoma Daughter        Review of Systems   Constitutional: Negative for chills, diaphoresis and fever.   Gastrointestinal: Negative.    Genitourinary: Positive for enuresis. Negative for difficulty urinating, dysuria, menstrual problem, pelvic pain, vaginal bleeding and vaginal discharge.   Psychiatric/Behavioral: Positive for sleep disturbance.           Objective   /82   Ht 167.6 cm (66\")   Wt 109 kg (239 lb 3.2 oz)   Breastfeeding No   BMI 38.61 kg/m²     Physical Exam  Constitutional:       Appearance: Normal appearance. She is well-developed. She is morbidly obese.   Eyes:      General: Lids are normal.      Extraocular Movements: Extraocular movements intact.      Conjunctiva/sclera: Conjunctivae normal.   Genitourinary:     Labia:         Right: No rash, tenderness or lesion.         Left: No rash, tenderness or lesion.       Urethra: No prolapse, urethral pain, urethral swelling or urethral lesion.      Vagina: No vaginal discharge, tenderness or lesions.      Cervix: No cervical motion tenderness, discharge, friability or lesion.      Uterus: Enlarged. Not tender.       Adnexa:         Right: No mass or tenderness.          Left: No mass or tenderness.        Comments: Significantly redundant left labia minora approx 4x4 cm excess tissue  Grade 1 cystocele  Skin:     General: Skin is warm and dry.      Findings: No bruising or lesion.   Neurological:      Mental Status: She is alert.   Psychiatric:         Attention and Perception: " Attention normal.         Mood and Affect: Mood normal.         Speech: Speech normal.         Behavior: Behavior is cooperative.            Result Review :   The following data was reviewed by: Lluvia Sears PA-C on 05/24/2022:    Data reviewed: CT scan report 2020            Assessment and Plan  Diagnoses and all orders for this visit:    1. Pelvic pressure in female (Primary)  -     US Non-ob Transvaginal    2. Numbness of perineum    3. Uterine leiomyoma, unspecified location  -     US Non-ob Transvaginal    4. Complex cyst of left ovary  -     US Non-ob Transvaginal    5. Cystocele, midline      Patient Instructions   Follow up 2 weeks for pelvic ultrasound and pending results discuss further management of symptoms             This note was electronically signed.    Lluvia Sears PA-C   May 24, 2022

## 2022-06-08 ENCOUNTER — HOSPITAL ENCOUNTER (OUTPATIENT)
Dept: PULMONOLOGY | Facility: HOSPITAL | Age: 62
Discharge: HOME | End: 2022-06-08
Admitting: INTERNAL MEDICINE

## 2022-06-08 DIAGNOSIS — J44.9 CHRONIC OBSTRUCTIVE PULMONARY DISEASE, UNSPECIFIED COPD TYPE: ICD-10-CM

## 2022-06-08 DIAGNOSIS — J41.0 CHRONIC BRONCHITIS, SIMPLE: ICD-10-CM

## 2022-06-08 DIAGNOSIS — R06.02 SHORTNESS OF BREATH: ICD-10-CM

## 2022-06-08 PROCEDURE — 94726 PLETHYSMOGRAPHY LUNG VOLUMES: CPT

## 2022-06-08 PROCEDURE — 94010 BREATHING CAPACITY TEST: CPT

## 2022-06-08 PROCEDURE — 94726 PLETHYSMOGRAPHY LUNG VOLUMES: CPT | Performed by: INTERNAL MEDICINE

## 2022-06-08 PROCEDURE — 94010 BREATHING CAPACITY TEST: CPT | Performed by: INTERNAL MEDICINE

## 2022-06-08 PROCEDURE — 94729 DIFFUSING CAPACITY: CPT

## 2022-06-08 PROCEDURE — 94729 DIFFUSING CAPACITY: CPT | Performed by: INTERNAL MEDICINE

## 2022-06-09 ENCOUNTER — HOSPITAL ENCOUNTER (OUTPATIENT)
Dept: SLEEP MEDICINE | Facility: HOSPITAL | Age: 62
Discharge: HOME | End: 2022-06-09
Admitting: INTERNAL MEDICINE

## 2022-06-09 DIAGNOSIS — G47.52 DREAM ENACTMENT BEHAVIOR: ICD-10-CM

## 2022-06-09 DIAGNOSIS — R06.83 SNORING: ICD-10-CM

## 2022-06-09 DIAGNOSIS — G47.8 SLEEP TALKING: ICD-10-CM

## 2022-06-09 DIAGNOSIS — G47.19 EXCESSIVE DAYTIME SLEEPINESS: ICD-10-CM

## 2022-06-09 DIAGNOSIS — J44.9 CHRONIC OBSTRUCTIVE PULMONARY DISEASE, UNSPECIFIED COPD TYPE: ICD-10-CM

## 2022-06-09 DIAGNOSIS — E66.01 MORBID OBESITY, UNSPECIFIED OBESITY TYPE: ICD-10-CM

## 2022-06-09 DIAGNOSIS — G47.33 OBSTRUCTIVE SLEEP APNEA: ICD-10-CM

## 2022-06-09 PROCEDURE — 95810 POLYSOM 6/> YRS 4/> PARAM: CPT | Performed by: INTERNAL MEDICINE

## 2022-06-09 PROCEDURE — 95810 POLYSOM 6/> YRS 4/> PARAM: CPT

## 2022-06-14 ENCOUNTER — OFFICE VISIT (OUTPATIENT)
Dept: OBSTETRICS AND GYNECOLOGY | Facility: CLINIC | Age: 62
End: 2022-06-14

## 2022-06-14 VITALS
WEIGHT: 244.6 LBS | SYSTOLIC BLOOD PRESSURE: 130 MMHG | HEIGHT: 66 IN | DIASTOLIC BLOOD PRESSURE: 84 MMHG | BODY MASS INDEX: 39.31 KG/M2

## 2022-06-14 DIAGNOSIS — D39.11 NEOPLASM OF UNCERTAIN BEHAVIOR OF RIGHT OVARY: ICD-10-CM

## 2022-06-14 DIAGNOSIS — N83.202 CYST OF LEFT OVARY: Primary | ICD-10-CM

## 2022-06-14 DIAGNOSIS — D25.1 FIBROIDS, INTRAMURAL: ICD-10-CM

## 2022-06-14 PROCEDURE — 99214 OFFICE O/P EST MOD 30 MIN: CPT | Performed by: PHYSICIAN ASSISTANT

## 2022-06-14 NOTE — PROGRESS NOTES
Subjective   Chief Complaint   Patient presents with   • Follow-up     TVS done today       Valentina Theodore is a 61 y.o. year old  presenting to be seen for follow-up.  Patient had been seen recently with complaints of some vaginal pain, pressure and numbness mainly on the right side of the vagina for about 2 years.  She had had some imaging by her PCP in  which noted a 3.5cm right fundal myoma and also a complex cyst on the left ovary.  It was recommended at the time the patient have additional imaging but she did not follow-up for that.  She returns today for transvaginal pelvic ultrasound.  On ultrasound her uterus is anteverted with multiple intramural fibroids.  The largest fibroid is 3.1 cm.  There is a 8 mm echogenic solid lesion on the right ovary.  There is a 4 cm septated cyst on the left ovary but the cyst overall appears mostly simple.  Both ovaries have vascular flow and there is no free fluid.    Past Medical History:   Diagnosis Date   • Abnormal ECG    • Abnormal Pap smear of cervix     Precancerous cells   • Ankle fracture     RIGHT ANKLE   • Arthritis    • Chest pain     4 STENTS PLACED   • Chlamydia    • Coronary artery disease    • Depression    • Fractures    • Heart disease    • History of transfusion    • Hyperlipidemia    • Hypertension    • Left upper arm injury     FELL THROUGH PLATE GLASS WINDOW    • Migraine    • Ovarian cyst    • Piercing     EARS ONLY   • Preeclampsia    • Scarring     LEFT ARM   • Substance abuse (HCC)    • Varicella         Current Outpatient Medications:   •  acetaminophen (TYLENOL) 650 MG 8 hr tablet, Take 650 mg by mouth Every 8 (Eight) Hours As Needed for Mild Pain ., Disp: , Rfl:   •  amitriptyline (ELAVIL) 25 MG tablet, Take 10 mg by mouth Every Night., Disp: , Rfl:   •  amLODIPine (NORVASC) 5 MG tablet, Take 5 mg by mouth., Disp: , Rfl:   •  busPIRone (BUSPAR) 5 MG tablet, Take 5 mg by mouth 3 (Three) Times a Day., Disp: ,  Rfl:   •  carvedilol (COREG) 6.25 MG tablet, Take 6.25 mg by mouth 2 (Two) Times a Day With Meals., Disp: , Rfl:   •  chlorthalidone (HYGROTON) 25 MG tablet, Take 25 mg by mouth Daily., Disp: , Rfl:   •  clopidogrel (PLAVIX) 75 MG tablet, Take 75 mg by mouth., Disp: , Rfl:   •  Fluticasone Propionate (FLONASE NA), into the nostril(s) as directed by provider., Disp: , Rfl:   •  loratadine (CLARITIN) 10 MG tablet, Take 10 mg by mouth Daily., Disp: , Rfl:   •  losartan (COZAAR) 100 MG tablet, , Disp: , Rfl:   •  nitroglycerin (NITROSTAT) 0.4 MG SL tablet, Place 0.4 mg under the tongue., Disp: , Rfl:   •  potassium chloride (K-DUR,KLOR-CON) 20 MEQ CR tablet, Take 20 mEq by mouth Daily., Disp: , Rfl:   •  sertraline (ZOLOFT) 50 MG tablet, Take 50 mg by mouth Daily., Disp: , Rfl:   •  simvastatin (ZOCOR) 20 MG tablet, Take 20 mg by mouth Every Night., Disp: , Rfl:   •  SM ASPIRIN ADULT LOW STRENGTH 81 MG EC tablet, Take 81 mg by mouth Daily., Disp: , Rfl:   •  tiotropium bromide-olodaterol (STIOLTO RESPIMAT) 2.5-2.5 MCG/ACT aerosol solution inhaler, Inhale 2 puffs Daily., Disp: 1 each, Rfl: 5   No Known Allergies   Past Surgical History:   Procedure Laterality Date   • ANKLE OPEN REDUCTION INTERNAL FIXATION Right 10/26/2018    Procedure: BIMALLEOLAR FRACTURE OPEN REDUCTION INTERNAL FIXATION;  Surgeon: Genaro Diaz DPM;  Location: [facility];  Service: Podiatry   • APPENDECTOMY     • ARM TENDON REPAIR Left 1976   • CARDIAC CATHETERIZATION      4 STENTS PLACED   • D & C WITH SUCTION  1987   • HAND RECONSTRUCTION      2-3 TIMES RIGHT   • THUMB ARTHROSCOPY Right    • TUBAL ABDOMINAL LIGATION     • WISDOM TOOTH EXTRACTION        Social History     Socioeconomic History   • Marital status:    Tobacco Use   • Smoking status: Current Every Day Smoker     Packs/day: 2.00     Years: 43.00     Pack years: 86.00     Types: Cigarettes     Start date: 1/1/1976   • Smokeless tobacco: Never Used   Vaping Use   • Vaping Use:  "Never used   Substance and Sexual Activity   • Alcohol use: Yes     Comment: Social drinking   • Drug use: Yes     Types: Marijuana     Comment: DAILY   • Sexual activity: Not Currently     Birth control/protection: Abstinence, Post-menopausal      Family History   Problem Relation Age of Onset   • Diabetes Mother    • Cancer Father    • Melanoma Father    • Stroke Maternal Grandfather    • Breast cancer Maternal Aunt    • Breast cancer Maternal Aunt    • Breast cancer Maternal Aunt    • Uterine cancer Daughter    • Melanoma Daughter        Review of Systems   Constitutional: Negative for chills, diaphoresis and fever.   Gastrointestinal: Negative.    Genitourinary: Negative for difficulty urinating, dysuria, menstrual problem and vaginal bleeding.           Objective   /84   Ht 167.6 cm (66\")   Wt 111 kg (244 lb 9.6 oz)   Breastfeeding No   BMI 39.48 kg/m²     Physical Exam       Result Review :                   Assessment and Plan  Diagnoses and all orders for this visit:    1. 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 Fibroids, intramural      Patient Instructions   Will check  today. If normal repeat ultrasound 6-8 weeks to recheck ovaries.              This note was electronically signed.    Lluvia Sears PA-C   Rosa 15, 2022  "

## 2022-06-15 LAB — CANCER AG125 SERPL-ACNC: 6.5 U/ML (ref 0–38.1)

## 2022-06-15 NOTE — PROGRESS NOTES
Patient is informed her  is in normal range.  Recommend a follow-up ultrasound in 6 to 8 weeks to recheck both ovaries.  We will also consider repeating the  at that time.  Patient voices understanding and she will call back in the next day or 2 to make an appointment for follow-up ultrasound

## 2022-06-16 DIAGNOSIS — G47.33 OSA (OBSTRUCTIVE SLEEP APNEA): Primary | ICD-10-CM

## 2022-08-23 ENCOUNTER — OFFICE VISIT (OUTPATIENT)
Dept: OBSTETRICS AND GYNECOLOGY | Facility: CLINIC | Age: 62
End: 2022-08-23

## 2022-08-23 VITALS
BODY MASS INDEX: 40.82 KG/M2 | HEIGHT: 65 IN | WEIGHT: 245 LBS | DIASTOLIC BLOOD PRESSURE: 74 MMHG | SYSTOLIC BLOOD PRESSURE: 132 MMHG

## 2022-08-23 DIAGNOSIS — Z12.31 ENCOUNTER FOR SCREENING MAMMOGRAM FOR MALIGNANT NEOPLASM OF BREAST: ICD-10-CM

## 2022-08-23 DIAGNOSIS — N83.202 CYST OF LEFT OVARY: Primary | ICD-10-CM

## 2022-08-23 PROCEDURE — 99213 OFFICE O/P EST LOW 20 MIN: CPT | Performed by: PHYSICIAN ASSISTANT

## 2022-08-23 RX ORDER — FLUTICASONE PROPIONATE 50 MCG
SPRAY, SUSPENSION (ML) NASAL
COMMUNITY
Start: 2022-08-02

## 2022-08-23 NOTE — PATIENT INSTRUCTIONS
We will consult MD regarding further management.  However with appearance of left cystic lesion being mostly simple and possibly a hydrosalpinx will plan on serial imaging with a follow-up ultrasound in 3 months. Also repeat  3 months. Will be contacted if any different recommendation

## 2022-08-23 NOTE — PROGRESS NOTES
Subjective   Chief Complaint   Patient presents with   • Follow-up     TVS       Valentina Theodore is a 61 y.o. year old  presenting to be seen for follow-up of left ovarian cyst.  Patient was seen in mid  with some complaints of pelvic pressure and vaginal pressure.  Transvaginal ultrasound was done which had noted multiple fibroids with the largest fibroid being 3 cm.  She was noted to have a 4 cm septated cystic area in the left adnexa which appeared mostly simple fluid.  She was also noted to have a 0.8 cm echogenic lesion in the right ovary on previous ultrasound.   obtained on last visit was normal at 6.5.   Patient reports she has had no changes from previous visit.  She is not having any pain but does feel some slight pressure in the lower pelvis.  Transvaginal pelvic ultrasound done today is reviewed with patient.  Again are noted multiple small fibroids unchanged.  She has a 4.9 cm septated cystic area in the left adnexa that again appears mostly simple fluid.  This is possibly consistent with dilated fallopian tube/hydrosalpinx.  Her right ovary appears normal today. There is no free fluid.  Bilateral ovaries have vascular flow  Would like to get updated screening mammogram    Past Medical History:   Diagnosis Date   • Abnormal ECG    • Abnormal Pap smear of cervix     Precancerous cells   • Ankle fracture     RIGHT ANKLE   • Arthritis    • Chest pain     4 STENTS PLACED   • Chlamydia 92   • Coronary artery disease    • Depression    • Fractures    • Heart disease    • History of transfusion    • Hyperlipidemia    • Hypertension    • Left upper arm injury     FELL THROUGH PLATE GLASS WINDOW    • Migraine    • Ovarian cyst    • Piercing     EARS ONLY   • Preeclampsia    • Scarring     LEFT ARM   • Substance abuse (HCC)    • Varicella 1965        Current Outpatient Medications:   •  acetaminophen (TYLENOL) 650 MG 8 hr tablet, Take 650 mg by mouth Every 8 (Eight) Hours As  Needed for Mild Pain ., Disp: , Rfl:   •  amitriptyline (ELAVIL) 25 MG tablet, Take 10 mg by mouth Every Night., Disp: , Rfl:   •  amLODIPine (NORVASC) 5 MG tablet, Take 5 mg by mouth., Disp: , Rfl:   •  busPIRone (BUSPAR) 5 MG tablet, Take 5 mg by mouth 3 (Three) Times a Day., Disp: , Rfl:   •  carvedilol (COREG) 6.25 MG tablet, Take 6.25 mg by mouth 2 (Two) Times a Day With Meals., Disp: , Rfl:   •  chlorthalidone (HYGROTON) 25 MG tablet, Take 25 mg by mouth Daily., Disp: , Rfl:   •  clopidogrel (PLAVIX) 75 MG tablet, Take 75 mg by mouth., Disp: , Rfl:   •  fluticasone (FLONASE) 50 MCG/ACT nasal spray, , Disp: , Rfl:   •  Fluticasone Propionate (FLONASE NA), into the nostril(s) as directed by provider., Disp: , Rfl:   •  loratadine (CLARITIN) 10 MG tablet, Take 10 mg by mouth Daily., Disp: , Rfl:   •  losartan (COZAAR) 100 MG tablet, , Disp: , Rfl:   •  nitroglycerin (NITROSTAT) 0.4 MG SL tablet, Place 0.4 mg under the tongue., Disp: , Rfl:   •  potassium chloride (K-DUR,KLOR-CON) 20 MEQ CR tablet, Take 20 mEq by mouth Daily., Disp: , Rfl:   •  sertraline (ZOLOFT) 50 MG tablet, Take 50 mg by mouth Daily., Disp: , Rfl:   •  simvastatin (ZOCOR) 20 MG tablet, Take 20 mg by mouth Every Night., Disp: , Rfl:   •  SM ASPIRIN ADULT LOW STRENGTH 81 MG EC tablet, Take 81 mg by mouth Daily., Disp: , Rfl:   •  tiotropium bromide-olodaterol (STIOLTO RESPIMAT) 2.5-2.5 MCG/ACT aerosol solution inhaler, Inhale 2 puffs Daily., Disp: 1 each, Rfl: 5   No Known Allergies   Past Surgical History:   Procedure Laterality Date   • ANKLE OPEN REDUCTION INTERNAL FIXATION Right 10/26/2018    Procedure: BIMALLEOLAR FRACTURE OPEN REDUCTION INTERNAL FIXATION;  Surgeon: Genaro Diaz DPM;  Location: [facility];  Service: Podiatry   • APPENDECTOMY     • ARM TENDON REPAIR Left 1976   • CARDIAC CATHETERIZATION      4 STENTS PLACED   • D & C WITH SUCTION  1987   • HAND RECONSTRUCTION      2-3 TIMES RIGHT   • THUMB ARTHROSCOPY Right    • TUBAL  "ABDOMINAL LIGATION     • WISDOM TOOTH EXTRACTION        Social History     Socioeconomic History   • Marital status:    Tobacco Use   • Smoking status: Current Every Day Smoker     Packs/day: 2.00     Years: 43.00     Pack years: 86.00     Types: Cigarettes     Start date: 1/1/1976   • Smokeless tobacco: Never Used   Vaping Use   • Vaping Use: Never used   Substance and Sexual Activity   • Alcohol use: Yes     Comment: Social drinking   • Drug use: Yes     Types: Marijuana     Comment: DAILY   • Sexual activity: Not Currently     Birth control/protection: Abstinence, Post-menopausal      Family History   Problem Relation Age of Onset   • Diabetes Mother    • Cancer Father    • Melanoma Father    • Stroke Maternal Grandfather    • Breast cancer Maternal Aunt    • Breast cancer Maternal Aunt    • Breast cancer Maternal Aunt    • Uterine cancer Daughter    • Melanoma Daughter        Review of Systems   Constitutional: Negative for chills, diaphoresis and fever.   Gastrointestinal: Negative.    Genitourinary: Negative for difficulty urinating, dysuria, pelvic pain and vaginal bleeding.           Objective   /74   Ht 165.1 cm (65\")   Wt 111 kg (245 lb)   BMI 40.77 kg/m²     Physical Exam  Constitutional:       Appearance: Normal appearance. She is well-developed and well-groomed.   Eyes:      General: Lids are normal.      Extraocular Movements: Extraocular movements intact.      Conjunctiva/sclera: Conjunctivae normal.   Skin:     General: Skin is warm and dry.      Findings: No bruising or lesion.   Neurological:      Mental Status: She is alert.   Psychiatric:         Attention and Perception: Attention normal.         Mood and Affect: Mood normal.         Speech: Speech normal.         Behavior: Behavior is cooperative.            Result Review :   The following data was reviewed by: Lluvia Sears PA-C on 08/23/2022:  AMSLABLIST:   Data reviewed: pelvic ultrasound images          "   Assessment and Plan  Diagnoses and all orders for this visit:    1. Cyst of left ovary (Primary)  -     US Non-ob Transvaginal    2. Encounter for screening mammogram for malignant neoplasm of breast  -     Mammo Screening Digital Tomosynthesis Bilateral With CAD; Future      Patient Instructions   We will consult MD regarding further management.  However with appearance of left cystic lesion being mostly simple and possibly a hydrosalpinx will plan on serial imaging with a follow-up ultrasound in 3 months. Also repeat  3 months. Will be contacted if any different recommendation             This note was electronically signed.    Lluvia Sears PA-C   August 29, 2022

## 2022-12-07 ENCOUNTER — OFFICE VISIT (OUTPATIENT)
Dept: PULMONOLOGY | Facility: CLINIC | Age: 62
End: 2022-12-07

## 2022-12-07 ENCOUNTER — OFFICE VISIT (OUTPATIENT)
Dept: OBSTETRICS AND GYNECOLOGY | Facility: CLINIC | Age: 62
End: 2022-12-07

## 2022-12-07 VITALS
HEART RATE: 72 BPM | BODY MASS INDEX: 39.99 KG/M2 | WEIGHT: 240 LBS | OXYGEN SATURATION: 97 % | RESPIRATION RATE: 18 BRPM | HEIGHT: 65 IN | DIASTOLIC BLOOD PRESSURE: 82 MMHG | SYSTOLIC BLOOD PRESSURE: 122 MMHG

## 2022-12-07 VITALS
DIASTOLIC BLOOD PRESSURE: 84 MMHG | WEIGHT: 241.9 LBS | SYSTOLIC BLOOD PRESSURE: 120 MMHG | HEIGHT: 65 IN | BODY MASS INDEX: 40.3 KG/M2

## 2022-12-07 DIAGNOSIS — N70.11 HYDROSALPINX: ICD-10-CM

## 2022-12-07 DIAGNOSIS — J30.9 ALLERGIC RHINITIS, UNSPECIFIED SEASONALITY, UNSPECIFIED TRIGGER: ICD-10-CM

## 2022-12-07 DIAGNOSIS — D39.12 NEOPLASM OF UNCERTAIN BEHAVIOR OF LEFT OVARY: ICD-10-CM

## 2022-12-07 DIAGNOSIS — J44.9 CHRONIC OBSTRUCTIVE PULMONARY DISEASE, UNSPECIFIED COPD TYPE: ICD-10-CM

## 2022-12-07 DIAGNOSIS — G47.33 OSA (OBSTRUCTIVE SLEEP APNEA): Primary | ICD-10-CM

## 2022-12-07 DIAGNOSIS — R06.02 SHORTNESS OF BREATH: ICD-10-CM

## 2022-12-07 DIAGNOSIS — E66.01 MORBID OBESITY, UNSPECIFIED OBESITY TYPE: ICD-10-CM

## 2022-12-07 DIAGNOSIS — N83.202 CYST OF LEFT OVARY: Primary | ICD-10-CM

## 2022-12-07 DIAGNOSIS — D25.1 FIBROIDS, INTRAMURAL: ICD-10-CM

## 2022-12-07 PROCEDURE — 99214 OFFICE O/P EST MOD 30 MIN: CPT | Performed by: NURSE PRACTITIONER

## 2022-12-07 PROCEDURE — 99213 OFFICE O/P EST LOW 20 MIN: CPT | Performed by: PHYSICIAN ASSISTANT

## 2022-12-07 NOTE — PROGRESS NOTES
Subjective   Chief Complaint   Patient presents with   • Follow-up     3 month follow-up TVS       Valentina Theodore is a 62 y.o. year old  presenting to be seen for follow-up fibroids and left adnexal cyst/probable hydrosalpinx.  Patient has no new complaints or concerns.  She reports she is not having any pelvic pain.  She does occasionally experience some pelvic pressure.  No vaginal bleeding.  She had a normal  in .  Transvaginal ultrasound done today is reviewed with patient.  Uterus is anteverted with multiple intramural fibroids.  Fibroids are stable with the largest noted to be 3 cm.  Endometrium is 7 mm.  Right ovary appears normal.  Left ovary is not visualized.  There is a 4.9 cm fluid-filled area near the left adnexa that appears to be fluid-filled fallopian tube/hydrosalpinx.  There is no free fluid noted.      Past Medical History:   Diagnosis Date   • Abnormal ECG    • Abnormal Pap smear of cervix     Precancerous cells   • Ankle fracture     RIGHT ANKLE   • Arthritis    • Chest pain     4 STENTS PLACED   • Chlamydia 89   • Coronary artery disease    • Depression    • Fractures    • Heart disease    • History of transfusion    • Hyperlipidemia    • Hypertension    • Left upper arm injury     FELL THROUGH PLATE GLASS WINDOW    • Migraine    • Ovarian cyst    • Piercing     EARS ONLY   • Preeclampsia    • Scarring     LEFT ARM   • Substance abuse (HCC)    • Varicella 1965        Current Outpatient Medications:   •  acetaminophen (TYLENOL) 650 MG 8 hr tablet, Take 650 mg by mouth Every 8 (Eight) Hours As Needed for Mild Pain ., Disp: , Rfl:   •  amitriptyline (ELAVIL) 25 MG tablet, Take 10 mg by mouth Every Night., Disp: , Rfl:   •  amLODIPine (NORVASC) 5 MG tablet, Take 5 mg by mouth., Disp: , Rfl:   •  busPIRone (BUSPAR) 5 MG tablet, Take 5 mg by mouth 3 (Three) Times a Day., Disp: , Rfl:   •  carvedilol (COREG) 6.25 MG tablet, Take 6.25 mg by mouth 2 (Two) Times a  Day With Meals., Disp: , Rfl:   •  chlorthalidone (HYGROTON) 25 MG tablet, Take 25 mg by mouth Daily., Disp: , Rfl:   •  clopidogrel (PLAVIX) 75 MG tablet, Take 75 mg by mouth., Disp: , Rfl:   •  fluticasone (FLONASE) 50 MCG/ACT nasal spray, , Disp: , Rfl:   •  loratadine (CLARITIN) 10 MG tablet, Take 10 mg by mouth Daily., Disp: , Rfl:   •  losartan (COZAAR) 100 MG tablet, , Disp: , Rfl:   •  nitroglycerin (NITROSTAT) 0.4 MG SL tablet, Place 0.4 mg under the tongue., Disp: , Rfl:   •  potassium chloride (K-DUR,KLOR-CON) 20 MEQ CR tablet, Take 20 mEq by mouth Daily., Disp: , Rfl:   •  sertraline (ZOLOFT) 50 MG tablet, Take 50 mg by mouth Daily., Disp: , Rfl:   •  simvastatin (ZOCOR) 20 MG tablet, Take 20 mg by mouth Every Night., Disp: , Rfl:   •  SM ASPIRIN ADULT LOW STRENGTH 81 MG EC tablet, Take 81 mg by mouth Daily., Disp: , Rfl:    No Known Allergies   Past Surgical History:   Procedure Laterality Date   • ANKLE OPEN REDUCTION INTERNAL FIXATION Right 10/26/2018    Procedure: BIMALLEOLAR FRACTURE OPEN REDUCTION INTERNAL FIXATION;  Surgeon: Genaro Diaz DPM;  Location: [facility];  Service: Podiatry   • APPENDECTOMY     • ARM TENDON REPAIR Left 1976   • CARDIAC CATHETERIZATION      4 STENTS PLACED   • D & C WITH SUCTION  1987   • HAND RECONSTRUCTION      2-3 TIMES RIGHT   • THUMB ARTHROSCOPY Right    • TUBAL ABDOMINAL LIGATION     • WISDOM TOOTH EXTRACTION        Social History     Socioeconomic History   • Marital status:    Tobacco Use   • Smoking status: Every Day     Packs/day: 2.00     Years: 43.00     Pack years: 86.00     Types: Cigarettes     Start date: 1/1/1976   • Smokeless tobacco: Never   Vaping Use   • Vaping Use: Never used   Substance and Sexual Activity   • Alcohol use: Yes     Comment: Social drinking   • Drug use: Yes     Types: Marijuana     Comment: DAILY   • Sexual activity: Not Currently     Birth control/protection: Abstinence, Post-menopausal      Family History   Problem  "Relation Age of Onset   • Diabetes Mother    • Cancer Father    • Melanoma Father    • Stroke Maternal Grandfather    • Breast cancer Maternal Aunt    • Breast cancer Maternal Aunt    • Breast cancer Maternal Aunt    • Uterine cancer Daughter    • Melanoma Daughter        Review of Systems   Constitutional: Negative for chills, diaphoresis and fever.   Gastrointestinal: Negative for constipation, diarrhea, nausea and vomiting.   Genitourinary: Negative for difficulty urinating, dysuria, pelvic pain and vaginal bleeding.           Objective   /84   Ht 165.1 cm (65\")   Wt 110 kg (241 lb 14.4 oz)   BMI 40.25 kg/m²     Physical Exam  Constitutional:       Appearance: Normal appearance. She is well-developed and well-groomed.   Eyes:      General: Lids are normal.      Extraocular Movements: Extraocular movements intact.      Conjunctiva/sclera: Conjunctivae normal.   Skin:     General: Skin is warm and dry.      Findings: No bruising or lesion.   Neurological:      General: No focal deficit present.      Mental Status: She is alert and oriented to person, place, and time.   Psychiatric:         Attention and Perception: Attention normal.         Mood and Affect: Mood normal.         Speech: Speech normal.         Behavior: Behavior is cooperative.            Result Review :   The following data was reviewed by: Lluvia Sears PA-C on 12/07/2022:  AMSLABLIST:   Data reviewed: pelvic ultrasouind images            Assessment and Plan  Diagnoses and all orders for this visit:    1. Cyst of left ovary (Primary)  -     US Non-ob Transvaginal  -     ; Future    2. Fibroids, intramural  -     US Non-ob Transvaginal  -     ; Future    3. Hydrosalpinx  -     ; Future    4. 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 If normal will consult MD regarding continued surveillance as patient desires conservative management.   "              This note was electronically signed.    Lluvia Sears PA-C   December 10, 2022

## 2022-12-07 NOTE — PROGRESS NOTES
"Chief Complaint   Patient presents with   • Follow-up   • Sleeping Problem         Subjective   Valentina Theodore is a 62 y.o. female.     History of Present Illness   The patient comes in today for follow-up of obstructive sleep apnea and SOB.    I reviewed her sleep study and discussed the results with her today.  The sleep study revealed severe sleep apnea with an apnea hypopnea index of 31 per hour.  Her apnea-hypopnea index was worse in REM sleep at 64 per hour. She was noted to have event related hypoxia.     Her been set up with AutoPAP at a pressure of 8/20.  She does not feel exhausted and is not falling asleep while sitting in traffic. She continues to wake some at night. She states her  had brain cancer and she was up with him a lot during the night and feels this is why she wakes.     She is tolerating the machine and is using machine 4+ hours a night.     She has SOB but feels this is due to weight gain. She has put on 70 lbs in last 4 years.     She stopped Stiolto because she did not feel it helped.     She uses Flonase daily and it helps.     She smokes under 2 packs/day now.    The following portions of the patient's history were reviewed and updated as appropriate: allergies, current medications, past family history, past medical history, past social history and past surgical history.    Review of Systems   HENT: Negative for sinus pressure, sneezing and sore throat.    Respiratory: Negative for cough, chest tightness, shortness of breath and wheezing.        Objective   Visit Vitals  /82   Pulse 72   Resp 18   Ht 165.1 cm (65\")   Wt 109 kg (240 lb)   SpO2 97%   BMI 39.94 kg/m²         Physical Exam  Vitals reviewed.   HENT:      Head: Atraumatic.      Mouth/Throat:      Mouth: Mucous membranes are moist.      Comments: Crowded oropharynx.  Cardiovascular:      Rate and Rhythm: Normal rate and regular rhythm.   Pulmonary:      Effort: Pulmonary effort is normal. No respiratory distress. "      Breath sounds: No wheezing or rhonchi.   Abdominal:      Comments: Obese abdomen.   Musculoskeletal:      Comments: Gait normal.   Skin:     General: Skin is warm.   Neurological:      Mental Status: She is alert and oriented to person, place, and time.           Assessment & Plan   Diagnoses and all orders for this visit:    1. MARC (obstructive sleep apnea) (Primary)  -     Overnight Sleep Oximetry Study; Future    2. Chronic obstructive pulmonary disease, unspecified COPD type (HCC)    3. Shortness of breath    4. Morbid obesity, unspecified obesity type (HCC)    5. Allergic rhinitis, unspecified seasonality, unspecified trigger           Return in about 4 months (around 4/7/2023) for Recheck, For Dr. Erwin.    DISCUSSION (if any):  I have asked her to continue using AutoPap at a pressure of 8/20.  She has noticed improvement in her sleep quality and daytime sleepiness since she began using the machine.    I have ordered an overnight pulse oximetry to evaluate her oxygen needs at night on Pap therapy.    I will asked the office staff to obtain a compliance report from DME.  Compliance seems to be good per patient.    PFT from June 2022 reveals no obstruction.  No restriction without air trapping suggested.  Preserved diffusion capacity.  I discussed these results today with the patient.    Overall her shortness of breath seems stable without the use of an inhaler. She tried Stiolto and did not feel it made a difference so stopped using the medication.     She should continue Flonase on a regular basis.    I have asked her to call the office if her shortness of breath worsens.    I have advised her to stop smoking altogether.    Dictated utilizing Dragon dictation.    This document was electronically signed by ANTON Mills December 12, 2022  11:21 EST

## 2022-12-08 ENCOUNTER — HOSPITAL ENCOUNTER (OUTPATIENT)
Dept: MAMMOGRAPHY | Facility: HOSPITAL | Age: 62
Discharge: HOME | End: 2022-12-08
Payer: MEDICARE

## 2022-12-08 DIAGNOSIS — Z12.31 BREAST CANCER SCREENING BY MAMMOGRAM: ICD-10-CM

## 2022-12-08 PROCEDURE — 77067 SCR MAMMO BI INCL CAD: CPT

## 2022-12-10 NOTE — PATIENT INSTRUCTIONS
Recommend repeat . If normal will consult MD regarding continued surveillance as patient desires conservative management.

## 2022-12-14 ENCOUNTER — TELEPHONE (OUTPATIENT)
Dept: OBSTETRICS AND GYNECOLOGY | Facility: CLINIC | Age: 62
End: 2022-12-14

## 2022-12-14 NOTE — TELEPHONE ENCOUNTER
Patient called back stating she missed your phone call.. I didn't see a message to tell her.      Thanks,    Marley

## 2023-01-30 ENCOUNTER — TELEPHONE (OUTPATIENT)
Dept: PULMONOLOGY | Facility: CLINIC | Age: 63
End: 2023-01-30
Payer: MEDICAID

## 2023-01-30 NOTE — TELEPHONE ENCOUNTER
The DME has tried to contact this patient 3 or more times with no success. I have tried to contact them once after being notified. The patients address listed and dme done a drive by and said home looks abandoned.

## 2023-05-10 ENCOUNTER — HOSPITAL ENCOUNTER (OUTPATIENT)
Dept: ULTRASOUND IMAGING | Facility: HOSPITAL | Age: 63
Discharge: HOME | End: 2023-05-10
Payer: MEDICARE

## 2023-05-10 ENCOUNTER — HOSPITAL ENCOUNTER (OUTPATIENT)
Dept: CT IMAGING | Facility: HOSPITAL | Age: 63
Discharge: HOME | End: 2023-05-10
Payer: MEDICARE

## 2023-05-10 DIAGNOSIS — F17.218 CIGARETTE NICOTINE DEPENDENCE WITH OTHER NICOTINE-INDUCED DISORDER: ICD-10-CM

## 2023-05-10 DIAGNOSIS — E04.1 THYROID NODULE: ICD-10-CM

## 2023-05-10 PROCEDURE — 76536 US EXAM OF HEAD AND NECK: CPT

## 2023-05-10 PROCEDURE — 71271 CT THORAX LUNG CANCER SCR C-: CPT

## 2023-05-17 ENCOUNTER — OFFICE VISIT (OUTPATIENT)
Dept: CARDIOLOGY | Facility: CLINIC | Age: 63
End: 2023-05-17
Payer: MEDICARE

## 2023-05-17 VITALS
HEART RATE: 86 BPM | OXYGEN SATURATION: 96 % | HEIGHT: 65 IN | SYSTOLIC BLOOD PRESSURE: 130 MMHG | BODY MASS INDEX: 38.65 KG/M2 | DIASTOLIC BLOOD PRESSURE: 82 MMHG | WEIGHT: 232 LBS

## 2023-05-17 DIAGNOSIS — J43.2 CENTRILOBULAR EMPHYSEMA: ICD-10-CM

## 2023-05-17 DIAGNOSIS — Z72.0 TOBACCO USE: ICD-10-CM

## 2023-05-17 DIAGNOSIS — E78.5 DYSLIPIDEMIA: ICD-10-CM

## 2023-05-17 DIAGNOSIS — I10 ESSENTIAL HYPERTENSION: ICD-10-CM

## 2023-05-17 DIAGNOSIS — I25.10 CORONARY ARTERY DISEASE INVOLVING NATIVE CORONARY ARTERY OF NATIVE HEART WITHOUT ANGINA PECTORIS: Primary | ICD-10-CM

## 2023-05-17 DIAGNOSIS — E66.01 MORBID OBESITY: ICD-10-CM

## 2023-05-17 RX ORDER — AMITRIPTYLINE HYDROCHLORIDE 10 MG/1
TABLET, FILM COATED ORAL
COMMUNITY
Start: 2023-04-18

## 2023-05-17 RX ORDER — SPIRONOLACTONE 25 MG/1
TABLET ORAL
COMMUNITY
Start: 2023-04-18

## 2023-05-17 NOTE — PROGRESS NOTES
Subjective:     Encounter Date:05/17/2023      Patient ID: Valentina Theodore is a 62 y.o. female.    Chief Complaint: Tobacco abuse  HPI  This is a 62-year-old female patient who presents to cardiology clinic for routine follow-up.  Since her last visit she has had no active cardiovascular issues, symptoms or hospitalizations.  She reports compliance with her medications with no perceived side effects.  She continues to smoke daily.  The following portions of the patient's history were reviewed and updated as appropriate: allergies, current medications, past family history, past medical history, past social history, past surgical history and problem  Review of Systems   Constitutional: Negative for chills, diaphoresis, fever, malaise/fatigue, weight gain and weight loss.   HENT: Negative for ear discharge, hearing loss, hoarse voice and nosebleeds.    Eyes: Negative for discharge, double vision, pain and photophobia.   Cardiovascular: Negative for chest pain, claudication, cyanosis, dyspnea on exertion, irregular heartbeat, leg swelling, near-syncope, orthopnea, palpitations, paroxysmal nocturnal dyspnea and syncope.   Respiratory: Negative for cough, hemoptysis, shortness of breath, sputum production and wheezing.    Endocrine: Negative for cold intolerance, heat intolerance, polydipsia, polyphagia and polyuria.   Hematologic/Lymphatic: Negative for adenopathy and bleeding problem. Does not bruise/bleed easily.   Skin: Negative for color change, flushing, itching and rash.   Musculoskeletal: Negative for muscle cramps, muscle weakness, myalgias and stiffness.   Gastrointestinal: Negative for abdominal pain, diarrhea, hematemesis, hematochezia, nausea and vomiting.   Genitourinary: Negative for dysuria, frequency and nocturia.   Neurological: Negative for focal weakness, loss of balance, numbness, paresthesias and seizures.   Psychiatric/Behavioral: Negative for altered mental status, hallucinations and suicidal  ideas.   Allergic/Immunologic: Negative for HIV exposure, hives and persistent infections.           Current Outpatient Medications:   •  acetaminophen (TYLENOL) 650 MG 8 hr tablet, Take 1 tablet by mouth Every 8 (Eight) Hours As Needed for Mild Pain., Disp: , Rfl:   •  amitriptyline (ELAVIL) 10 MG tablet, , Disp: , Rfl:   •  amLODIPine (NORVASC) 5 MG tablet, Take 1 tablet by mouth., Disp: , Rfl:   •  busPIRone (BUSPAR) 5 MG tablet, Take 1 tablet by mouth 3 (Three) Times a Day., Disp: , Rfl:   •  carvedilol (COREG) 6.25 MG tablet, Take 1 tablet by mouth 2 (Two) Times a Day With Meals., Disp: , Rfl:   •  chlorthalidone (HYGROTON) 25 MG tablet, Take 1 tablet by mouth Daily., Disp: , Rfl:   •  clopidogrel (PLAVIX) 75 MG tablet, Take 1 tablet by mouth., Disp: , Rfl:   •  fluticasone (FLONASE) 50 MCG/ACT nasal spray, , Disp: , Rfl:   •  loratadine (CLARITIN) 10 MG tablet, Take 1 tablet by mouth Daily., Disp: , Rfl:   •  losartan (COZAAR) 100 MG tablet, , Disp: , Rfl:   •  nitroglycerin (NITROSTAT) 0.4 MG SL tablet, Place 1 tablet under the tongue., Disp: , Rfl:   •  potassium chloride (K-DUR,KLOR-CON) 20 MEQ CR tablet, Take 1 tablet by mouth Daily., Disp: , Rfl:   •  sertraline (ZOLOFT) 50 MG tablet, Take 1 tablet by mouth Daily., Disp: , Rfl:   •  simvastatin (ZOCOR) 20 MG tablet, Take 1 tablet by mouth Every Night., Disp: , Rfl:   •  SM ASPIRIN ADULT LOW STRENGTH 81 MG EC tablet, Take 1 tablet by mouth Daily., Disp: , Rfl:   •  spironolactone (ALDACTONE) 25 MG tablet, , Disp: , Rfl:     Objective:   Vitals and nursing note reviewed.   Constitutional:       Appearance: Healthy appearance. Not in distress.   Neck:      Vascular: No JVR. JVD normal.   Pulmonary:      Effort: Pulmonary effort is normal.      Breath sounds: Normal breath sounds. No wheezing. No rhonchi. No rales.   Chest:      Chest wall: Not tender to palpatation.   Cardiovascular:      PMI at left midclavicular line. Normal rate. Regular rhythm. Normal  "S1. Normal S2.      Murmurs: There is no murmur.      No gallop. No click. No rub.   Pulses:     Intact distal pulses.   Edema:     Peripheral edema absent.   Abdominal:      General: Bowel sounds are normal.      Palpations: Abdomen is soft.      Tenderness: There is no abdominal tenderness.   Musculoskeletal: Normal range of motion.         General: No tenderness. Skin:     General: Skin is warm and dry.   Neurological:      General: No focal deficit present.      Mental Status: Alert and oriented to person, place and time.       Blood pressure 130/82, pulse 86, height 165.1 cm (65\"), weight 105 kg (232 lb), SpO2 96 %, not currently breastfeeding.   Lab Review:     Assessment:     #1.  Coronary artery disease.  Native heart.  Native vessels.  Angina free.    #2.  Essential hypertension.  Acceptable blood pressure control.  Reported compliance with medications.    #3.  Dyslipidemia.  Tolerating statin based cholesterol-lowering therapy without side effects.    #4.  Class III obesity.  Body mass index is greater than 38.  The obesity pattern is truncal.  This is due to excess calorie intake.  Multiple comorbidities.    #5.  Emphysema.  Typical tobacco related lung disease.  Stable symptom pattern.    #6.  Tobacco abuse.  Continue daily cigarette smoking.    Procedures    Plan:     Advance Care Planning   ACP discussion was held with the patient during this visit. Patient has an advance directive (not in EMR), copy requested.     Unfortunately, the epic based algorithm to detect ongoing cigarette smoking and electronically \"trigger\" smoking cessation counseling failed to recognize that the patient continues to smoke daily.  This is of particular concern as this is a \"tracked metric\" used for quality purposes.  The patient has again been counseled regarding the essential need to discontinue cigarette smoking.    No changes in medications have been made at today's visit.    Importance of diet exercise and weight " management has been discussed again with the patient.    No cardiovascular testing is indicated at this time.

## 2023-06-07 ENCOUNTER — OFFICE VISIT (OUTPATIENT)
Dept: OBSTETRICS AND GYNECOLOGY | Facility: CLINIC | Age: 63
End: 2023-06-07
Payer: MEDICARE

## 2023-06-07 VITALS
BODY MASS INDEX: 38.65 KG/M2 | HEIGHT: 65 IN | SYSTOLIC BLOOD PRESSURE: 126 MMHG | DIASTOLIC BLOOD PRESSURE: 80 MMHG | WEIGHT: 232 LBS

## 2023-06-07 DIAGNOSIS — N70.11 HYDROSALPINX: ICD-10-CM

## 2023-06-07 DIAGNOSIS — D25.9 UTERINE LEIOMYOMA, UNSPECIFIED LOCATION: ICD-10-CM

## 2023-06-07 DIAGNOSIS — N94.9 VAGINAL BURNING: ICD-10-CM

## 2023-06-07 DIAGNOSIS — N83.202 CYST OF LEFT OVARY: Primary | ICD-10-CM

## 2023-06-07 DIAGNOSIS — D39.12 NEOPLASM OF UNCERTAIN BEHAVIOR OF LEFT OVARY: ICD-10-CM

## 2023-06-07 PROCEDURE — 1160F RVW MEDS BY RX/DR IN RCRD: CPT | Performed by: PHYSICIAN ASSISTANT

## 2023-06-07 PROCEDURE — 1159F MED LIST DOCD IN RCRD: CPT | Performed by: PHYSICIAN ASSISTANT

## 2023-06-07 PROCEDURE — 3074F SYST BP LT 130 MM HG: CPT | Performed by: PHYSICIAN ASSISTANT

## 2023-06-07 PROCEDURE — 3079F DIAST BP 80-89 MM HG: CPT | Performed by: PHYSICIAN ASSISTANT

## 2023-06-07 PROCEDURE — 99214 OFFICE O/P EST MOD 30 MIN: CPT | Performed by: PHYSICIAN ASSISTANT

## 2023-06-07 RX ORDER — ESTRADIOL 0.1 MG/G
CREAM VAGINAL
Qty: 42.5 G | Refills: 3 | Status: SHIPPED | OUTPATIENT
Start: 2023-06-07

## 2023-06-07 NOTE — PROGRESS NOTES
Subjective   Chief Complaint   Patient presents with    Follow-up     6 month follow-up TVS       Valentina Leslie Theodore is a 62 y.o. year old  presenting to be seen for follow-up uterine fibroids and probable hydrosalpinx.  Patient reports that she has been experiencing some vaginal burning and irritation.  She declines pelvic exam today.  She has had no bleeding or vaginal discharge.  She is not using any vaginal estrogen.  She has been followed for the above since ultrasound done in 2020 by her PCP had noted uterine fibroids and a left adnexal cystic area that appears to be a hydrosalpinx.  Multiple follow-up ultrasounds have remained stable.  She had a normal  of 6.5 in 2022.  Transvaginal ultrasound is done and reviewed with patient.  Uterus is anteverted with multiple intramural fibroids which are stable from prior ultrasounds.  Largest fibroid is 3 cm.  Endometrium is 5.7 mm.  Bilateral ovaries are normal with vascular flow.  Persistent left adnexal fluid collection around 2 consistent with probable hydrosalpinx.  There is no free fluid or adnexal masses.    Past Medical History:   Diagnosis Date    Abnormal ECG     Abnormal Pap smear of cervix     Precancerous cells    Ankle fracture     RIGHT ANKLE    Arthritis     Chest pain     4 STENTS PLACED    Chlamydia 89    Coronary artery disease     Depression     Fractures     Heart disease     History of transfusion     Hyperlipidemia     Hypertension     Left upper arm injury     FELL THROUGH PLATE GLASS WINDOW     Migraine     Ovarian cyst     Piercing     EARS ONLY    Preeclampsia     Scarring     LEFT ARM    Substance abuse     Varicella 1965        Current Outpatient Medications:     acetaminophen (TYLENOL) 650 MG 8 hr tablet, Take 1 tablet by mouth Every 8 (Eight) Hours As Needed for Mild Pain., Disp: , Rfl:     amitriptyline (ELAVIL) 10 MG tablet, , Disp: , Rfl:     amLODIPine (NORVASC) 5 MG tablet, Take 1 tablet by  mouth., Disp: , Rfl:     busPIRone (BUSPAR) 5 MG tablet, Take 1 tablet by mouth 3 (Three) Times a Day., Disp: , Rfl:     carvedilol (COREG) 6.25 MG tablet, Take 1 tablet by mouth 2 (Two) Times a Day With Meals., Disp: , Rfl:     chlorthalidone (HYGROTON) 25 MG tablet, Take 1 tablet by mouth Daily., Disp: , Rfl:     clopidogrel (PLAVIX) 75 MG tablet, Take 1 tablet by mouth., Disp: , Rfl:     fluticasone (FLONASE) 50 MCG/ACT nasal spray, , Disp: , Rfl:     loratadine (CLARITIN) 10 MG tablet, Take 1 tablet by mouth Daily., Disp: , Rfl:     losartan (COZAAR) 100 MG tablet, , Disp: , Rfl:     nitroglycerin (NITROSTAT) 0.4 MG SL tablet, Place 1 tablet under the tongue., Disp: , Rfl:     potassium chloride (K-DUR,KLOR-CON) 20 MEQ CR tablet, Take 1 tablet by mouth Daily., Disp: , Rfl:     sertraline (ZOLOFT) 50 MG tablet, Take 1 tablet by mouth Daily., Disp: , Rfl:     simvastatin (ZOCOR) 20 MG tablet, Take 1 tablet by mouth Every Night., Disp: , Rfl:     SM ASPIRIN ADULT LOW STRENGTH 81 MG EC tablet, Take 1 tablet by mouth Daily., Disp: , Rfl:     spironolactone (ALDACTONE) 25 MG tablet, , Disp: , Rfl:     estradiol (ESTRACE VAGINAL) 0.1 MG/GM vaginal cream, Insert 1 gm intravaginally 2 times each week, Disp: 42.5 g, Rfl: 3   No Known Allergies   Past Surgical History:   Procedure Laterality Date    ANKLE OPEN REDUCTION INTERNAL FIXATION Right 10/26/2018    Procedure: BIMALLEOLAR FRACTURE OPEN REDUCTION INTERNAL FIXATION;  Surgeon: Genaro Diaz DPM;  Location: [facility] OR;  Service: Podiatry    APPENDECTOMY      ARM TENDON REPAIR Left 1976    CARDIAC CATHETERIZATION      4 STENTS PLACED    D & C WITH SUCTION  1987    HAND RECONSTRUCTION      2-3 TIMES RIGHT    THUMB ARTHROSCOPY Right     TUBAL ABDOMINAL LIGATION      WISDOM TOOTH EXTRACTION        Social History     Socioeconomic History    Marital status:    Tobacco Use    Smoking status: Every Day     Packs/day: 2.00     Years: 43.00     Pack years: 86.00      "Types: Cigarettes     Start date: 1/1/1976     Passive exposure: Current    Smokeless tobacco: Never   Vaping Use    Vaping Use: Never used   Substance and Sexual Activity    Alcohol use: Yes     Comment: Social drinking    Drug use: Yes     Types: Marijuana     Comment: DAILY    Sexual activity: Not Currently     Birth control/protection: Abstinence, Post-menopausal      Family History   Problem Relation Age of Onset    Diabetes Mother     Cancer Father     Melanoma Father     Stroke Maternal Grandfather     Breast cancer Maternal Aunt     Breast cancer Maternal Aunt     Breast cancer Maternal Aunt     Uterine cancer Daughter     Melanoma Daughter        Review of Systems   Constitutional:  Negative for chills, diaphoresis and fever.   Gastrointestinal: Negative.    Genitourinary:  Positive for vaginal pain. Negative for difficulty urinating, dysuria, pelvic pain, vaginal bleeding and vaginal discharge.         Objective   /80   Ht 165.1 cm (65\")   Wt 105 kg (232 lb)   BMI 38.61 kg/m²     Physical Exam  Constitutional:       Appearance: Normal appearance. She is well-developed.   Eyes:      General: Lids are normal.      Extraocular Movements: Extraocular movements intact.      Conjunctiva/sclera: Conjunctivae normal.   Psychiatric:         Attention and Perception: Attention normal.         Mood and Affect: Mood normal.         Speech: Speech normal.         Behavior: Behavior is cooperative.          Result Review :            (06/14/2022 09:37)   US Non-ob Transvaginal (06/07/2023 10:50)   US Non-ob Transvaginal (12/07/2022 12:01)   US Non-ob Transvaginal (08/23/2022 10:34)   US Non-ob Transvaginal (06/14/2022 09:22)   US NON OB TRANSVAGINAL (08/07/2020 09:11)         Assessment and Plan  Diagnoses and all orders for this visit:    1. Cyst of left ovary (Primary)  -         2. 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 Uterine leiomyoma, " unspecified location    5. Vaginal burning    Other orders  -     estradiol (ESTRACE VAGINAL) 0.1 MG/GM vaginal cream; Insert 1 gm intravaginally 2 times each week  Dispense: 42.5 g; Refill: 3      Patient Instructions   Patient is offered a trial of estrogen cream and she would like to try this.  We will repeat  today.  If  remains normal we will plan follow-up ultrasound in 1 year unless patient develops any symptoms of pelvic pain, pressure, or any other concerns           This note was electronically signed.    Lluvia Sears PA-C   June 8, 2023

## 2023-06-08 LAB — CANCER AG125 SERPL-ACNC: 7.9 U/ML (ref 0–38.1)

## 2023-06-08 NOTE — PATIENT INSTRUCTIONS
Patient is offered a trial of estrogen cream and she would like to try this.  We will repeat  today.  If  remains normal we will plan follow-up ultrasound in 1 year unless patient develops any symptoms of pelvic pain, pressure, or any other concerns

## 2023-09-12 ENCOUNTER — TELEPHONE (OUTPATIENT)
Dept: SURGERY | Facility: CLINIC | Age: 63
End: 2023-09-12
Payer: MEDICARE

## 2023-09-12 NOTE — TELEPHONE ENCOUNTER
Pt would like to be scheduled at [facility] on 11/21-Verified Pharmacy- Thank you.

## 2023-09-12 NOTE — TELEPHONE ENCOUNTER
"PRESCREENING FOR OPEN ACCESS SCHEDULING    Valentina Theodore, 1960  [id number]    09/12/23    If, the patient answers yes to any of the following questions the provider will be informed prior to scheduling open access for approval and documented in the chart.    [x]  Yes  [] No    1. Have you ever had a colonoscopy in the past?      When:  about 6 yrs ago      Where: unsure      Polyps or other:     []  Yes  [x] No    2. Family history of colon cancer?      Relation:       Age of onset:       Do you currently have any of the following?    []  Yes  [x] No  Rectal bleeding, if so, how long?     []  Yes  [x] No  Abdominal pain, if so, how long?    [x]  Yes  [] No  Constipation, if so, how long? Stated \"Many Years\"    []  Yes  [x] No  Diarrhea, if so, how long?    []  Yes  [x] No  Weight loss, is so, how much?    [] Yes  [x] No  Small caliber stool, if so, how long?      Have you ever had any of the following conditions?    [] Yes  [x] No  Heart attack?      When?       Last cardiac workup?     Blood thinners? PLAVIX    [] Yes  [x] No   Lung problems, asthma or COPD?  [] Yes  [x] No  Oxygen required?       [] Yes  [x] No  Stroke?     [] Yes  [x] No  Have you ever had a reaction to anesthesia?             "

## 2023-09-14 ENCOUNTER — OFFICE VISIT (OUTPATIENT)
Dept: PULMONOLOGY | Facility: CLINIC | Age: 63
End: 2023-09-14
Payer: MEDICARE

## 2023-09-14 VITALS
BODY MASS INDEX: 38.49 KG/M2 | DIASTOLIC BLOOD PRESSURE: 78 MMHG | HEART RATE: 75 BPM | OXYGEN SATURATION: 97 % | SYSTOLIC BLOOD PRESSURE: 126 MMHG | WEIGHT: 231 LBS | RESPIRATION RATE: 18 BRPM | HEIGHT: 65 IN

## 2023-09-14 DIAGNOSIS — G47.33 OSA (OBSTRUCTIVE SLEEP APNEA): ICD-10-CM

## 2023-09-14 DIAGNOSIS — E66.9 OBESITY (BMI 30-39.9): ICD-10-CM

## 2023-09-14 DIAGNOSIS — J42 CHRONIC BRONCHITIS, UNSPECIFIED CHRONIC BRONCHITIS TYPE: Primary | ICD-10-CM

## 2023-09-14 DIAGNOSIS — F17.210 NICOTINE DEPENDENCE, CIGARETTES, UNCOMPLICATED: ICD-10-CM

## 2023-09-14 PROCEDURE — 3078F DIAST BP <80 MM HG: CPT | Performed by: INTERNAL MEDICINE

## 2023-09-14 PROCEDURE — 99214 OFFICE O/P EST MOD 30 MIN: CPT | Performed by: INTERNAL MEDICINE

## 2023-09-14 PROCEDURE — 3074F SYST BP LT 130 MM HG: CPT | Performed by: INTERNAL MEDICINE

## 2023-09-14 RX ORDER — BISACODYL 5 MG/1
TABLET, DELAYED RELEASE ORAL
Qty: 4 TABLET | Refills: 0 | Status: SHIPPED | OUTPATIENT
Start: 2023-09-14

## 2023-09-14 RX ORDER — POLYETHYLENE GLYCOL 3350 17 G/17G
POWDER, FOR SOLUTION ORAL
Qty: 238 EACH | Refills: 0 | Status: SHIPPED | OUTPATIENT
Start: 2023-09-14

## 2023-09-14 NOTE — PROGRESS NOTES
"  Chief Complaint   Patient presents with    Sleeping Problem    Follow-up       Subjective   Valentina Theodore is a 62 y.o. female.   Missed her last 2 appointments    Patient was evaluated today for follow up of MARC and COPD.     Patient says that her symptoms have been stable since the last clinic visit. she reports no recent exacerbations.     Exercise tolerance has also remained stable.    Continues to smoke 1 pack per day.    Patient doesn't report any issues with the PAP device. The patient describes no significant issues with her mask either.     Patient says that the compliance with the use of the equipment is good.     Patient says that her symptoms of fatigue & daytime sleepiness have been helped greatly with the use of PAP, as prescribed.     She is not on oxygen at night.     The following portions of the patient's history were reviewed and updated as appropriate: allergies, current medications, past family history, past medical history, past social history, past surgical history, and problem list.    Review of Systems   HENT:  Positive for sinus pressure. Negative for sneezing and sore throat.    Respiratory:  Positive for cough. Negative for chest tightness, shortness of breath and wheezing.      Objective   Visit Vitals  /78   Pulse 75   Resp 18   Ht 165.1 cm (65\") Comment: pt reported   Wt 105 kg (231 lb)   SpO2 97%   BMI 38.44 kg/m²         BMI Readings from Last 3 Encounters:   09/14/23 38.44 kg/m²   06/07/23 38.61 kg/m²   05/17/23 38.61 kg/m²       Physical Exam  Vitals reviewed.   Constitutional:       Appearance: She is well-developed.   HENT:      Head: Atraumatic.      Mouth/Throat:      Comments: Oropharynx was crowded.  Pulmonary:      Effort: Pulmonary effort is normal.      Comments: Somewhat hyperresonant to percussion.  Somewhat decreased air entry.  No wheezing noted.   Musculoskeletal:      Comments: Gait was normal.   Neurological:      Mental Status: She is alert and oriented " to person, place, and time.         Assessment & Plan   Diagnoses and all orders for this visit:    1. Chronic bronchitis, unspecified chronic bronchitis type (Primary)  -     Complete PFT - Pre & Post Bronchodilator; Future    2. MARC (obstructive sleep apnea)  -     Overnight Sleep Oximetry Study; Future    3. Obesity (BMI 30-39.9)    4. Nicotine dependence, cigarettes, uncomplicated  -     Overnight Sleep Oximetry Study; Future           Return in about 9 months (around 6/14/2024) for Recheck, PFT F/U, For Yun Hollins).    DISCUSSION (if any):  Last CT scan results was reviewed in great detail with the patient.  May 2023. No nodules or active disease noted.     Latest available PFTs were reviewed.  Consistent with normal overall. Performed in June 2022.    PFTs will be ordered to be done upon follow up.    Patient was strongly encouraged to quit smoking as soon as possible.    The patient belongs to the risk group for which lung cancer screening has been recommended. We will try to make arrangements for the same and this will be indicated in May 2024. I have made the patient aware of my strong recommendation to discuss this further with primary care provider.    Sleep study performed in June 2022  AHI was 31 / hour.   REM AHI was 64/hour.     Latest PAP device provided in July 2022  DME company: [employer]    Current PAP settings: ?  Current mask type: Nasal Cushion    Compliance data will be obtained from the her device/DME company.    Continue PAP device on a regular basis, at least 4 hours or more per night.    Sleep hygiene measures were discussed    Weight loss advised.    The patient was reminded to stay up-to-date with influenza and pneumonia vaccinations. she will discuss this with her PCP.       Dictated utilizing Dragon dictation.    This document was electronically signed by Luis Enrique Erwin MD on 09/14/23 at 15:42 EDT

## 2023-09-18 ENCOUNTER — PREP FOR SURGERY (OUTPATIENT)
Dept: OTHER | Facility: HOSPITAL | Age: 63
End: 2023-09-18
Payer: MEDICARE

## 2023-09-18 DIAGNOSIS — Z12.11 ENCOUNTER FOR SCREENING COLONOSCOPY: Primary | ICD-10-CM

## 2023-10-02 ENCOUNTER — TELEPHONE (OUTPATIENT)
Dept: PULMONOLOGY | Facility: CLINIC | Age: 63
End: 2023-10-02
Payer: MEDICARE

## 2023-10-02 NOTE — TELEPHONE ENCOUNTER
I have attempted without success to contact this patient by phone to discuss the results of their overnight oximetry test. Voicemail Left.    
intact

## 2023-10-03 ENCOUNTER — TELEPHONE (OUTPATIENT)
Dept: PULMONOLOGY | Facility: CLINIC | Age: 63
End: 2023-10-03
Payer: MEDICARE

## 2023-10-03 NOTE — TELEPHONE ENCOUNTER
I have attempted without success to contact this patient by phone to discuss the results of their overnight oximetry test. Voicemail Left.

## 2023-10-03 NOTE — TELEPHONE ENCOUNTER
The test has on room air. She used her CPAP the night of the test.     I have reached out to [facility]. They are to correct test and send updated version.

## 2023-10-10 ENCOUNTER — TELEPHONE (OUTPATIENT)
Dept: PULMONOLOGY | Facility: CLINIC | Age: 63
End: 2023-10-10
Payer: MEDICARE

## 2023-10-10 NOTE — TELEPHONE ENCOUNTER
I have discussed with the patent over the phone the results of their overnight pulse oximetry results. No need for oxygen at night.

## 2023-10-12 DIAGNOSIS — F17.210 NICOTINE DEPENDENCE, CIGARETTES, UNCOMPLICATED: ICD-10-CM

## 2023-10-12 DIAGNOSIS — G47.33 OSA (OBSTRUCTIVE SLEEP APNEA): ICD-10-CM

## 2023-11-03 ENCOUNTER — TELEPHONE (OUTPATIENT)
Dept: SURGERY | Facility: CLINIC | Age: 63
End: 2023-11-03
Payer: MEDICARE

## 2023-11-03 NOTE — TELEPHONE ENCOUNTER
S/w pt regarding her [employer] insurance-she understood that Dr. Conn is not currently in network with [facility] and wanted to CX her procedure at this time.

## 2023-11-06 NOTE — TELEPHONE ENCOUNTER
Colonoscopy has been canceled at [facility] on 11/21/23 due to [employer] insurance being out-of-network. [facility] has been notified.

## 2024-07-16 ENCOUNTER — HOSPITAL ENCOUNTER (OUTPATIENT)
Dept: CT IMAGING | Facility: HOSPITAL | Age: 64
Discharge: HOME | End: 2024-07-16
Payer: MEDICARE

## 2024-07-16 ENCOUNTER — HOSPITAL ENCOUNTER (OUTPATIENT)
Dept: MAMMOGRAPHY | Facility: HOSPITAL | Age: 64
Discharge: HOME | End: 2024-07-16
Payer: MEDICARE

## 2024-07-16 VITALS — WEIGHT: 190 LBS | BODY MASS INDEX: 31.65 KG/M2 | HEIGHT: 65 IN

## 2024-07-16 DIAGNOSIS — Z12.2 SCREENING FOR MALIGNANT NEOPLASM OF RESPIRATORY ORGAN: ICD-10-CM

## 2024-07-16 DIAGNOSIS — Z12.31 VISIT FOR SCREENING MAMMOGRAM: ICD-10-CM

## 2024-07-16 PROCEDURE — 77063 BREAST TOMOSYNTHESIS BI: CPT

## 2024-07-16 PROCEDURE — 71271 CT THORAX LUNG CANCER SCR C-: CPT

## 2025-05-27 ENCOUNTER — TRANSCRIBE ORDERS (OUTPATIENT)
Dept: GENERAL RADIOLOGY | Facility: HOSPITAL | Age: 65
End: 2025-05-27

## 2025-05-27 DIAGNOSIS — F17.218 CIGARETTE NICOTINE DEPENDENCE WITH OTHER NICOTINE-INDUCED DISORDER: Primary | ICD-10-CM

## 2025-05-27 DIAGNOSIS — Z12.31 ENCOUNTER FOR SCREENING MAMMOGRAM FOR MALIGNANT NEOPLASM OF BREAST: ICD-10-CM

## 2025-07-28 ENCOUNTER — HOSPITAL ENCOUNTER (OUTPATIENT)
Dept: MAMMOGRAPHY | Facility: HOSPITAL | Age: 65
Discharge: HOME | End: 2025-07-28
Payer: MEDICARE

## 2025-07-28 ENCOUNTER — APPOINTMENT (OUTPATIENT)
Dept: CT IMAGING | Facility: HOSPITAL | Age: 65
End: 2025-07-28
Payer: MEDICARE

## 2025-07-28 DIAGNOSIS — Z12.31 ENCOUNTER FOR SCREENING MAMMOGRAM FOR MALIGNANT NEOPLASM OF BREAST: ICD-10-CM

## 2025-07-28 PROCEDURE — 77067 SCR MAMMO BI INCL CAD: CPT

## (undated) DEVICE — SUT ETHLN 3/0 FS1 663G

## (undated) DEVICE — GLV SURG BIOGEL PI ULTRATOUCH G SZ7.5 LF

## (undated) DEVICE — DRILL,  2.4 X 140MM, SOLID, MEASURING, LONG, AO: Brand: BABY GORILLA®/GORILLA® PLATING SYSTEM

## (undated) DEVICE — NON-ADHERENT STRIPS,OIL EMULSION: Brand: CURITY

## (undated) DEVICE — PK EXTRM LOWR 20

## (undated) DEVICE — CUFF SCD HEMOFORCE SEQ CALF STD MD

## (undated) DEVICE — K-WIRE, SINGLE ENDED TROCAR TIP, SMOOTH, 1.2 X 150MM
Type: IMPLANTABLE DEVICE | Site: ANKLE | Status: NON-FUNCTIONAL
Brand: MONSTER SCREW SYSTEM
Removed: 2018-10-26

## (undated) DEVICE — CLAVICLE STRAP: Brand: DEROYAL

## (undated) DEVICE — SUT VIC 2/0 SH 27IN

## (undated) DEVICE — BANDAGE,GAUZE,BULKEE II,4.5"X4.1YD,STRL: Brand: MEDLINE

## (undated) DEVICE — SPNG GZ WOVN 4X4IN 12PLY 10/BX STRL

## (undated) DEVICE — OLIVE WIRE, SMOOTH, 1.4MM
Type: IMPLANTABLE DEVICE | Site: ANKLE | Status: NON-FUNCTIONAL
Brand: BABY GORILLA/GORILLA PLATING SYSTEM
Removed: 2018-10-26

## (undated) DEVICE — BNDG ESMARK 6INX9FT STRL

## (undated) DEVICE — FLEXIBLE YANKAUER,MEDIUM TIP, NO VACUUM CONTROL: Brand: ARGYLE

## (undated) DEVICE — DRILL, 2.6 X 130MM, CANNULATED, AO: Brand: MONSTER SCREW SYSTEM

## (undated) DEVICE — BANDAGE,GAUZE,CONFORMING,4"X75",STRL,LF: Brand: MEDLINE

## (undated) DEVICE — ST IRR CYSTO W/SPK 77IN LF

## (undated) DEVICE — SUT VIC 3/0 SH 27IN J416H

## (undated) DEVICE — BNDG ELAS ELITE V/CLOSE 4IN 5YD LF STRL

## (undated) DEVICE — 3.5 X 26 MM R3CON NON-LOCKING PLATE SCREW
Type: IMPLANTABLE DEVICE | Site: ANKLE | Status: NON-FUNCTIONAL
Brand: GORILLA PLATING SYSTEM
Removed: 2018-10-26

## (undated) DEVICE — SMARTGOWN SURGICAL GOWN, 2XL: Brand: CONVERTORS

## (undated) DEVICE — SPLNT 1 STEP 4INX30IN

## (undated) DEVICE — DISPOSABLE TOURNIQUET CUFF SINGLE BLADDER, SINGLE PORT AND LUER LOCK CONNECTOR: Brand: COLOR CUFF

## (undated) DEVICE — BANDAGE,GAUZE,CONFORMING,6"X80",STRL,LF: Brand: MEDLINE

## (undated) DEVICE — GLV SURG SENSICARE GREEN W/ALOE PF LF 8 STRL

## (undated) DEVICE — BNDG ELAS ELITE V/CLOSE 6IN 5YD LF STRL

## (undated) DEVICE — OVER DRILL, 3.5 X 110MM, SOLID, AO: Brand: BABY GORILLA®/GORILLA® PLATING SYSTEM

## (undated) DEVICE — PROXIMATE SKIN STAPLERS (35 WIDE) CONTAINS 35 STAINLESS STEEL STAPLES (FIXED HEAD): Brand: PROXIMATE